# Patient Record
Sex: FEMALE | Race: WHITE | NOT HISPANIC OR LATINO | Employment: UNEMPLOYED | URBAN - METROPOLITAN AREA
[De-identification: names, ages, dates, MRNs, and addresses within clinical notes are randomized per-mention and may not be internally consistent; named-entity substitution may affect disease eponyms.]

---

## 2025-03-19 ENCOUNTER — APPOINTMENT (OUTPATIENT)
Dept: CARDIOLOGY | Facility: HOSPITAL | Age: 60
End: 2025-03-19
Payer: COMMERCIAL

## 2025-03-19 ENCOUNTER — HOSPITAL ENCOUNTER (OUTPATIENT)
Facility: HOSPITAL | Age: 60
Setting detail: OBSERVATION
Discharge: HOME | End: 2025-03-22
Attending: EMERGENCY MEDICINE | Admitting: STUDENT IN AN ORGANIZED HEALTH CARE EDUCATION/TRAINING PROGRAM
Payer: COMMERCIAL

## 2025-03-19 ENCOUNTER — APPOINTMENT (OUTPATIENT)
Dept: RADIOLOGY | Facility: HOSPITAL | Age: 60
End: 2025-03-19
Payer: COMMERCIAL

## 2025-03-19 DIAGNOSIS — R07.9 CHEST PAIN, UNSPECIFIED TYPE: ICD-10-CM

## 2025-03-19 DIAGNOSIS — I10 ESSENTIAL (PRIMARY) HYPERTENSION: ICD-10-CM

## 2025-03-19 DIAGNOSIS — I16.1 HYPERTENSIVE EMERGENCY: Primary | ICD-10-CM

## 2025-03-19 LAB
ALBUMIN SERPL BCP-MCNC: 4.3 G/DL (ref 3.4–5)
ALP SERPL-CCNC: 73 U/L (ref 33–110)
ALT SERPL W P-5'-P-CCNC: 42 U/L (ref 7–45)
ANION GAP SERPL CALC-SCNC: 16 MMOL/L (ref 10–20)
AST SERPL W P-5'-P-CCNC: 39 U/L (ref 9–39)
BASOPHILS # BLD AUTO: 0.04 X10*3/UL (ref 0–0.1)
BASOPHILS NFR BLD AUTO: 0.4 %
BILIRUB SERPL-MCNC: 0.4 MG/DL (ref 0–1.2)
BUN SERPL-MCNC: 17 MG/DL (ref 6–23)
CALCIUM SERPL-MCNC: 9.5 MG/DL (ref 8.6–10.3)
CARDIAC TROPONIN I PNL SERPL HS: 21 NG/L (ref 0–13)
CHLORIDE SERPL-SCNC: 103 MMOL/L (ref 98–107)
CO2 SERPL-SCNC: 26 MMOL/L (ref 21–32)
CREAT SERPL-MCNC: 0.64 MG/DL (ref 0.5–1.05)
EGFRCR SERPLBLD CKD-EPI 2021: >90 ML/MIN/1.73M*2
EOSINOPHIL # BLD AUTO: 0.36 X10*3/UL (ref 0–0.7)
EOSINOPHIL NFR BLD AUTO: 3.3 %
ERYTHROCYTE [DISTWIDTH] IN BLOOD BY AUTOMATED COUNT: 13 % (ref 11.5–14.5)
GLUCOSE SERPL-MCNC: 153 MG/DL (ref 74–99)
HCT VFR BLD AUTO: 37.6 % (ref 36–46)
HGB BLD-MCNC: 12.9 G/DL (ref 12–16)
IMM GRANULOCYTES # BLD AUTO: 0.03 X10*3/UL (ref 0–0.7)
IMM GRANULOCYTES NFR BLD AUTO: 0.3 % (ref 0–0.9)
LYMPHOCYTES # BLD AUTO: 3.65 X10*3/UL (ref 1.2–4.8)
LYMPHOCYTES NFR BLD AUTO: 33.2 %
MCH RBC QN AUTO: 29.7 PG (ref 26–34)
MCHC RBC AUTO-ENTMCNC: 34.3 G/DL (ref 32–36)
MCV RBC AUTO: 87 FL (ref 80–100)
MONOCYTES # BLD AUTO: 0.94 X10*3/UL (ref 0.1–1)
MONOCYTES NFR BLD AUTO: 8.6 %
NEUTROPHILS # BLD AUTO: 5.97 X10*3/UL (ref 1.2–7.7)
NEUTROPHILS NFR BLD AUTO: 54.2 %
NRBC BLD-RTO: 0 /100 WBCS (ref 0–0)
PLATELET # BLD AUTO: 204 X10*3/UL (ref 150–450)
POTASSIUM SERPL-SCNC: 3.6 MMOL/L (ref 3.5–5.3)
PROT SERPL-MCNC: 7.6 G/DL (ref 6.4–8.2)
RBC # BLD AUTO: 4.34 X10*6/UL (ref 4–5.2)
SODIUM SERPL-SCNC: 141 MMOL/L (ref 136–145)
WBC # BLD AUTO: 11 X10*3/UL (ref 4.4–11.3)

## 2025-03-19 PROCEDURE — 80053 COMPREHEN METABOLIC PANEL: CPT | Performed by: EMERGENCY MEDICINE

## 2025-03-19 PROCEDURE — 93005 ELECTROCARDIOGRAM TRACING: CPT

## 2025-03-19 PROCEDURE — 71045 X-RAY EXAM CHEST 1 VIEW: CPT

## 2025-03-19 PROCEDURE — 2500000004 HC RX 250 GENERAL PHARMACY W/ HCPCS (ALT 636 FOR OP/ED): Performed by: EMERGENCY MEDICINE

## 2025-03-19 PROCEDURE — 84484 ASSAY OF TROPONIN QUANT: CPT | Performed by: EMERGENCY MEDICINE

## 2025-03-19 PROCEDURE — 85025 COMPLETE CBC W/AUTO DIFF WBC: CPT | Performed by: EMERGENCY MEDICINE

## 2025-03-19 PROCEDURE — 96374 THER/PROPH/DIAG INJ IV PUSH: CPT

## 2025-03-19 PROCEDURE — 70450 CT HEAD/BRAIN W/O DYE: CPT | Performed by: STUDENT IN AN ORGANIZED HEALTH CARE EDUCATION/TRAINING PROGRAM

## 2025-03-19 PROCEDURE — 99285 EMERGENCY DEPT VISIT HI MDM: CPT | Mod: 25 | Performed by: EMERGENCY MEDICINE

## 2025-03-19 PROCEDURE — 36415 COLL VENOUS BLD VENIPUNCTURE: CPT | Performed by: EMERGENCY MEDICINE

## 2025-03-19 PROCEDURE — 93010 ELECTROCARDIOGRAM REPORT: CPT | Performed by: INTERNAL MEDICINE

## 2025-03-19 PROCEDURE — 70450 CT HEAD/BRAIN W/O DYE: CPT

## 2025-03-19 PROCEDURE — 99285 EMERGENCY DEPT VISIT HI MDM: CPT | Performed by: EMERGENCY MEDICINE

## 2025-03-19 PROCEDURE — 71045 X-RAY EXAM CHEST 1 VIEW: CPT | Performed by: RADIOLOGY

## 2025-03-19 RX ORDER — LABETALOL HYDROCHLORIDE 5 MG/ML
10 INJECTION, SOLUTION INTRAVENOUS ONCE
Status: COMPLETED | OUTPATIENT
Start: 2025-03-19 | End: 2025-03-19

## 2025-03-19 RX ADMIN — LABETALOL HYDROCHLORIDE 10 MG: 5 INJECTION, SOLUTION INTRAVENOUS at 23:54

## 2025-03-19 ASSESSMENT — PAIN - FUNCTIONAL ASSESSMENT: PAIN_FUNCTIONAL_ASSESSMENT: 0-10

## 2025-03-19 ASSESSMENT — COLUMBIA-SUICIDE SEVERITY RATING SCALE - C-SSRS
2. HAVE YOU ACTUALLY HAD ANY THOUGHTS OF KILLING YOURSELF?: NO
1. IN THE PAST MONTH, HAVE YOU WISHED YOU WERE DEAD OR WISHED YOU COULD GO TO SLEEP AND NOT WAKE UP?: NO
6. HAVE YOU EVER DONE ANYTHING, STARTED TO DO ANYTHING, OR PREPARED TO DO ANYTHING TO END YOUR LIFE?: NO

## 2025-03-19 ASSESSMENT — HEART SCORE
HEART SCORE: 4
ECG: NORMAL
AGE: 45-64
HISTORY: MODERATELY SUSPICIOUS
TROPONIN: LESS THAN OR EQUAL TO NORMAL LIMIT
RISK FACTORS: >2 RISK FACTORS OR HX OF ATHEROSCLEROTIC DISEASE

## 2025-03-19 ASSESSMENT — PAIN SCALES - GENERAL
PAINLEVEL_OUTOF10: 3
PAINLEVEL_OUTOF10: 0 - NO PAIN

## 2025-03-19 ASSESSMENT — PAIN DESCRIPTION - ORIENTATION: ORIENTATION: LEFT;UPPER

## 2025-03-19 ASSESSMENT — PAIN DESCRIPTION - LOCATION: LOCATION: CHEST

## 2025-03-20 ENCOUNTER — APPOINTMENT (OUTPATIENT)
Dept: CARDIOLOGY | Facility: HOSPITAL | Age: 60
End: 2025-03-20
Payer: COMMERCIAL

## 2025-03-20 PROBLEM — R07.9 CHEST PAIN: Status: ACTIVE | Noted: 2025-03-20

## 2025-03-20 LAB
ALBUMIN SERPL BCP-MCNC: 3.8 G/DL (ref 3.4–5)
ANION GAP SERPL CALC-SCNC: 13 MMOL/L (ref 10–20)
AORTIC VALVE MEAN GRADIENT: 9 MMHG
AORTIC VALVE PEAK VELOCITY: 2.04 M/S
ATRIAL RATE: 70 BPM
ATRIAL RATE: 73 BPM
ATRIAL RATE: 75 BPM
ATRIAL RATE: 76 BPM
AV PEAK GRADIENT: 17 MMHG
AVA (PEAK VEL): 2.04 CM2
AVA (VTI): 2.1 CM2
BASOPHILS # BLD AUTO: 0.03 X10*3/UL (ref 0–0.1)
BASOPHILS NFR BLD AUTO: 0.3 %
BUN SERPL-MCNC: 18 MG/DL (ref 6–23)
CALCIUM SERPL-MCNC: 9.1 MG/DL (ref 8.6–10.3)
CARDIAC TROPONIN I PNL SERPL HS: 17 NG/L (ref 0–13)
CARDIAC TROPONIN I PNL SERPL HS: 22 NG/L (ref 0–13)
CHLORIDE SERPL-SCNC: 102 MMOL/L (ref 98–107)
CO2 SERPL-SCNC: 25 MMOL/L (ref 21–32)
CREAT SERPL-MCNC: 0.59 MG/DL (ref 0.5–1.05)
EGFRCR SERPLBLD CKD-EPI 2021: >90 ML/MIN/1.73M*2
EJECTION FRACTION APICAL 4 CHAMBER: 67.2
EJECTION FRACTION: 60 %
EOSINOPHIL # BLD AUTO: 0.23 X10*3/UL (ref 0–0.7)
EOSINOPHIL NFR BLD AUTO: 2.6 %
ERYTHROCYTE [DISTWIDTH] IN BLOOD BY AUTOMATED COUNT: 12.9 % (ref 11.5–14.5)
GLUCOSE BLD MANUAL STRIP-MCNC: 241 MG/DL (ref 74–99)
GLUCOSE BLD MANUAL STRIP-MCNC: 282 MG/DL (ref 74–99)
GLUCOSE BLD MANUAL STRIP-MCNC: 305 MG/DL (ref 74–99)
GLUCOSE SERPL-MCNC: 291 MG/DL (ref 74–99)
HCT VFR BLD AUTO: 35.4 % (ref 36–46)
HGB BLD-MCNC: 11.8 G/DL (ref 12–16)
IMM GRANULOCYTES # BLD AUTO: 0.04 X10*3/UL (ref 0–0.7)
IMM GRANULOCYTES NFR BLD AUTO: 0.5 % (ref 0–0.9)
LEFT ATRIUM VOLUME AREA LENGTH INDEX BSA: 35.3 ML/M2
LEFT VENTRICLE INTERNAL DIMENSION DIASTOLE: 5.6 CM (ref 3.5–6)
LEFT VENTRICULAR OUTFLOW TRACT DIAMETER: 1.9 CM
LV EJECTION FRACTION BIPLANE: 63 %
LYMPHOCYTES # BLD AUTO: 2.57 X10*3/UL (ref 1.2–4.8)
LYMPHOCYTES NFR BLD AUTO: 29.3 %
MAGNESIUM SERPL-MCNC: 1.3 MG/DL (ref 1.6–2.4)
MAGNESIUM SERPL-MCNC: 1.37 MG/DL (ref 1.6–2.4)
MCH RBC QN AUTO: 29.6 PG (ref 26–34)
MCHC RBC AUTO-ENTMCNC: 33.3 G/DL (ref 32–36)
MCV RBC AUTO: 89 FL (ref 80–100)
MITRAL VALVE E/A RATIO: 1.74
MONOCYTES # BLD AUTO: 0.59 X10*3/UL (ref 0.1–1)
MONOCYTES NFR BLD AUTO: 6.7 %
NEUTROPHILS # BLD AUTO: 5.32 X10*3/UL (ref 1.2–7.7)
NEUTROPHILS NFR BLD AUTO: 60.6 %
NRBC BLD-RTO: 0 /100 WBCS (ref 0–0)
P AXIS: 59 DEGREES
P AXIS: 96 DEGREES
P OFFSET: 162 MS
P OFFSET: 177 MS
P ONSET: 115 MS
P ONSET: 119 MS
PHOSPHATE SERPL-MCNC: 3.5 MG/DL (ref 2.5–4.9)
PLATELET # BLD AUTO: 178 X10*3/UL (ref 150–450)
POTASSIUM SERPL-SCNC: 3.6 MMOL/L (ref 3.5–5.3)
PR INTERVAL: 188 MS
PR INTERVAL: 192 MS
PR INTERVAL: 192 MS
PR INTERVAL: 200 MS
Q ONSET: 213 MS
Q ONSET: 215 MS
Q ONSET: 215 MS
Q ONSET: 216 MS
QRS COUNT: 11 BEATS
QRS COUNT: 13 BEATS
QRS DURATION: 102 MS
QRS DURATION: 102 MS
QRS DURATION: 106 MS
QRS DURATION: 108 MS
QT INTERVAL: 396 MS
QT INTERVAL: 402 MS
QT INTERVAL: 408 MS
QT INTERVAL: 420 MS
QTC CALCULATION(BAZETT): 440 MS
QTC CALCULATION(BAZETT): 442 MS
QTC CALCULATION(BAZETT): 445 MS
QTC CALCULATION(BAZETT): 469 MS
QTC FREDERICIA: 428 MS
QTC FREDERICIA: 429 MS
QTC FREDERICIA: 429 MS
QTC FREDERICIA: 452 MS
R AXIS: 11 DEGREES
R AXIS: 20 DEGREES
R AXIS: 23 DEGREES
R AXIS: 24 DEGREES
RBC # BLD AUTO: 3.99 X10*6/UL (ref 4–5.2)
RIGHT VENTRICLE FREE WALL PEAK S': 12.3 CM/S
RIGHT VENTRICLE PEAK SYSTOLIC PRESSURE: 33.5 MMHG
SODIUM SERPL-SCNC: 136 MMOL/L (ref 136–145)
T AXIS: 22 DEGREES
T AXIS: 24 DEGREES
T AXIS: 25 DEGREES
T AXIS: 29 DEGREES
T OFFSET: 413 MS
T OFFSET: 417 MS
T OFFSET: 419 MS
T OFFSET: 423 MS
TRICUSPID ANNULAR PLANE SYSTOLIC EXCURSION: 2.1 CM
VENTRICULAR RATE: 70 BPM
VENTRICULAR RATE: 73 BPM
VENTRICULAR RATE: 75 BPM
VENTRICULAR RATE: 76 BPM
WBC # BLD AUTO: 8.8 X10*3/UL (ref 4.4–11.3)

## 2025-03-20 PROCEDURE — 84484 ASSAY OF TROPONIN QUANT: CPT | Performed by: EMERGENCY MEDICINE

## 2025-03-20 PROCEDURE — 36415 COLL VENOUS BLD VENIPUNCTURE: CPT | Performed by: EMERGENCY MEDICINE

## 2025-03-20 PROCEDURE — 82947 ASSAY GLUCOSE BLOOD QUANT: CPT | Mod: 59

## 2025-03-20 PROCEDURE — 80069 RENAL FUNCTION PANEL: CPT

## 2025-03-20 PROCEDURE — 83735 ASSAY OF MAGNESIUM: CPT

## 2025-03-20 PROCEDURE — 2500000004 HC RX 250 GENERAL PHARMACY W/ HCPCS (ALT 636 FOR OP/ED): Performed by: STUDENT IN AN ORGANIZED HEALTH CARE EDUCATION/TRAINING PROGRAM

## 2025-03-20 PROCEDURE — 2500000004 HC RX 250 GENERAL PHARMACY W/ HCPCS (ALT 636 FOR OP/ED)

## 2025-03-20 PROCEDURE — 93010 ELECTROCARDIOGRAM REPORT: CPT | Performed by: INTERNAL MEDICINE

## 2025-03-20 PROCEDURE — 2500000002 HC RX 250 W HCPCS SELF ADMINISTERED DRUGS (ALT 637 FOR MEDICARE OP, ALT 636 FOR OP/ED)

## 2025-03-20 PROCEDURE — 93005 ELECTROCARDIOGRAM TRACING: CPT

## 2025-03-20 PROCEDURE — G0378 HOSPITAL OBSERVATION PER HR: HCPCS

## 2025-03-20 PROCEDURE — 99255 IP/OBS CONSLTJ NEW/EST HI 80: CPT | Performed by: INTERNAL MEDICINE

## 2025-03-20 PROCEDURE — 96372 THER/PROPH/DIAG INJ SC/IM: CPT

## 2025-03-20 PROCEDURE — 83036 HEMOGLOBIN GLYCOSYLATED A1C: CPT | Mod: STJLAB

## 2025-03-20 PROCEDURE — 2500000001 HC RX 250 WO HCPCS SELF ADMINISTERED DRUGS (ALT 637 FOR MEDICARE OP)

## 2025-03-20 PROCEDURE — 93306 TTE W/DOPPLER COMPLETE: CPT | Performed by: INTERNAL MEDICINE

## 2025-03-20 PROCEDURE — 93306 TTE W/DOPPLER COMPLETE: CPT

## 2025-03-20 PROCEDURE — 85025 COMPLETE CBC W/AUTO DIFF WBC: CPT

## 2025-03-20 PROCEDURE — 2500000004 HC RX 250 GENERAL PHARMACY W/ HCPCS (ALT 636 FOR OP/ED): Performed by: EMERGENCY MEDICINE

## 2025-03-20 PROCEDURE — 36415 COLL VENOUS BLD VENIPUNCTURE: CPT

## 2025-03-20 PROCEDURE — 84484 ASSAY OF TROPONIN QUANT: CPT

## 2025-03-20 PROCEDURE — 99223 1ST HOSP IP/OBS HIGH 75: CPT

## 2025-03-20 PROCEDURE — 2500000001 HC RX 250 WO HCPCS SELF ADMINISTERED DRUGS (ALT 637 FOR MEDICARE OP): Performed by: EMERGENCY MEDICINE

## 2025-03-20 PROCEDURE — 2500000001 HC RX 250 WO HCPCS SELF ADMINISTERED DRUGS (ALT 637 FOR MEDICARE OP): Performed by: INTERNAL MEDICINE

## 2025-03-20 PROCEDURE — 96376 TX/PRO/DX INJ SAME DRUG ADON: CPT | Mod: 59

## 2025-03-20 RX ORDER — LABETALOL HYDROCHLORIDE 5 MG/ML
10 INJECTION, SOLUTION INTRAVENOUS ONCE
Status: COMPLETED | OUTPATIENT
Start: 2025-03-20 | End: 2025-03-20

## 2025-03-20 RX ORDER — VALSARTAN 80 MG/1
80 TABLET ORAL 2 TIMES DAILY
Status: COMPLETED | OUTPATIENT
Start: 2025-03-20 | End: 2025-03-21

## 2025-03-20 RX ORDER — CETIRIZINE HYDROCHLORIDE 10 MG/1
10 TABLET ORAL DAILY PRN
Status: DISCONTINUED | OUTPATIENT
Start: 2025-03-20 | End: 2025-03-22 | Stop reason: HOSPADM

## 2025-03-20 RX ORDER — CHLORTHALIDONE 25 MG/1
25 TABLET ORAL DAILY
Status: DISCONTINUED | OUTPATIENT
Start: 2025-03-20 | End: 2025-03-22 | Stop reason: HOSPADM

## 2025-03-20 RX ORDER — LISINOPRIL 20 MG/1
20 TABLET ORAL DAILY
Status: DISCONTINUED | OUTPATIENT
Start: 2025-03-20 | End: 2025-03-20

## 2025-03-20 RX ORDER — DEXTROSE 50 % IN WATER (D50W) INTRAVENOUS SYRINGE
12.5
Status: DISCONTINUED | OUTPATIENT
Start: 2025-03-20 | End: 2025-03-21 | Stop reason: SDUPTHER

## 2025-03-20 RX ORDER — ONDANSETRON HYDROCHLORIDE 2 MG/ML
4 INJECTION, SOLUTION INTRAVENOUS EVERY 6 HOURS PRN
Status: DISCONTINUED | OUTPATIENT
Start: 2025-03-20 | End: 2025-03-22 | Stop reason: HOSPADM

## 2025-03-20 RX ORDER — POLYETHYLENE GLYCOL 3350 17 G/17G
17 POWDER, FOR SOLUTION ORAL DAILY
Status: DISCONTINUED | OUTPATIENT
Start: 2025-03-20 | End: 2025-03-22 | Stop reason: HOSPADM

## 2025-03-20 RX ORDER — METOPROLOL SUCCINATE 25 MG/1
25 TABLET, EXTENDED RELEASE ORAL ONCE
Status: COMPLETED | OUTPATIENT
Start: 2025-03-20 | End: 2025-03-20

## 2025-03-20 RX ORDER — DIPHENHYDRAMINE HCL 25 MG
25 TABLET ORAL ONCE
Status: COMPLETED | OUTPATIENT
Start: 2025-03-20 | End: 2025-03-20

## 2025-03-20 RX ORDER — PANTOPRAZOLE SODIUM 40 MG/1
40 TABLET, DELAYED RELEASE ORAL
Status: DISCONTINUED | OUTPATIENT
Start: 2025-03-21 | End: 2025-03-22 | Stop reason: HOSPADM

## 2025-03-20 RX ORDER — MULTIVIT-MIN/IRON FUM/FOLIC AC 7.5 MG-4
1 TABLET ORAL DAILY
COMMUNITY

## 2025-03-20 RX ORDER — ATORVASTATIN CALCIUM 40 MG/1
40 TABLET, FILM COATED ORAL NIGHTLY
Status: DISCONTINUED | OUTPATIENT
Start: 2025-03-20 | End: 2025-03-22 | Stop reason: HOSPADM

## 2025-03-20 RX ORDER — CETIRIZINE HYDROCHLORIDE 10 MG/1
10 TABLET ORAL DAILY PRN
COMMUNITY

## 2025-03-20 RX ORDER — INSULIN GLARGINE 100 [IU]/ML
65 INJECTION, SOLUTION SUBCUTANEOUS NIGHTLY
COMMUNITY
End: 2025-03-22 | Stop reason: HOSPADM

## 2025-03-20 RX ORDER — CHOLECALCIFEROL (VITAMIN D3) 50 MCG
50 TABLET ORAL DAILY
COMMUNITY

## 2025-03-20 RX ORDER — CYCLOBENZAPRINE HCL 10 MG
10 TABLET ORAL 2 TIMES DAILY PRN
COMMUNITY

## 2025-03-20 RX ORDER — METFORMIN HYDROCHLORIDE 1000 MG/1
1000 TABLET ORAL
COMMUNITY

## 2025-03-20 RX ORDER — DEXTROSE 50 % IN WATER (D50W) INTRAVENOUS SYRINGE
25
Status: DISCONTINUED | OUTPATIENT
Start: 2025-03-20 | End: 2025-03-22 | Stop reason: HOSPADM

## 2025-03-20 RX ORDER — NAPROXEN SODIUM 220 MG/1
324 TABLET, FILM COATED ORAL ONCE
Status: COMPLETED | OUTPATIENT
Start: 2025-03-20 | End: 2025-03-20

## 2025-03-20 RX ORDER — ESTRADIOL 1 MG/1
1 TABLET ORAL DAILY
COMMUNITY

## 2025-03-20 RX ORDER — CYCLOBENZAPRINE HCL 10 MG
10 TABLET ORAL 2 TIMES DAILY PRN
Status: DISCONTINUED | OUTPATIENT
Start: 2025-03-20 | End: 2025-03-22 | Stop reason: HOSPADM

## 2025-03-20 RX ORDER — ATORVASTATIN CALCIUM 40 MG/1
40 TABLET, FILM COATED ORAL NIGHTLY
COMMUNITY

## 2025-03-20 RX ORDER — DEXTROSE 50 % IN WATER (D50W) INTRAVENOUS SYRINGE
25
Status: DISCONTINUED | OUTPATIENT
Start: 2025-03-20 | End: 2025-03-21 | Stop reason: SDUPTHER

## 2025-03-20 RX ORDER — ENOXAPARIN SODIUM 100 MG/ML
40 INJECTION SUBCUTANEOUS
Status: DISCONTINUED | OUTPATIENT
Start: 2025-03-20 | End: 2025-03-22 | Stop reason: HOSPADM

## 2025-03-20 RX ORDER — ESTRADIOL 1 MG/1
1 TABLET ORAL DAILY
Status: DISCONTINUED | OUTPATIENT
Start: 2025-03-20 | End: 2025-03-22 | Stop reason: HOSPADM

## 2025-03-20 RX ORDER — INSULIN LISPRO 100 [IU]/ML
5 INJECTION, SOLUTION INTRAVENOUS; SUBCUTANEOUS
Status: DISCONTINUED | OUTPATIENT
Start: 2025-03-20 | End: 2025-03-22 | Stop reason: HOSPADM

## 2025-03-20 RX ORDER — INSULIN GLARGINE 100 [IU]/ML
20 INJECTION, SOLUTION SUBCUTANEOUS NIGHTLY
Status: DISCONTINUED | OUTPATIENT
Start: 2025-03-20 | End: 2025-03-21

## 2025-03-20 RX ORDER — ASPIRIN 81 MG/1
81 TABLET ORAL DAILY
COMMUNITY

## 2025-03-20 RX ORDER — GLIPIZIDE 10 MG/1
10 TABLET ORAL
COMMUNITY

## 2025-03-20 RX ORDER — INSULIN LISPRO 100 [IU]/ML
0-5 INJECTION, SOLUTION INTRAVENOUS; SUBCUTANEOUS
Status: DISCONTINUED | OUTPATIENT
Start: 2025-03-20 | End: 2025-03-22 | Stop reason: HOSPADM

## 2025-03-20 RX ORDER — CLONIDINE HYDROCHLORIDE 0.1 MG/1
0.1 TABLET ORAL EVERY 6 HOURS PRN
Status: DISCONTINUED | OUTPATIENT
Start: 2025-03-20 | End: 2025-03-22 | Stop reason: HOSPADM

## 2025-03-20 RX ORDER — LISINOPRIL 40 MG/1
40 TABLET ORAL DAILY
COMMUNITY
End: 2025-03-22 | Stop reason: HOSPADM

## 2025-03-20 RX ORDER — PANTOPRAZOLE SODIUM 40 MG/1
40 TABLET, DELAYED RELEASE ORAL
COMMUNITY

## 2025-03-20 RX ORDER — DEXTROSE 50 % IN WATER (D50W) INTRAVENOUS SYRINGE
12.5
Status: DISCONTINUED | OUTPATIENT
Start: 2025-03-20 | End: 2025-03-22 | Stop reason: HOSPADM

## 2025-03-20 RX ADMIN — CHLORTHALIDONE 25 MG: 25 TABLET ORAL at 11:29

## 2025-03-20 RX ADMIN — LABETALOL HYDROCHLORIDE 10 MG: 5 INJECTION, SOLUTION INTRAVENOUS at 02:12

## 2025-03-20 RX ADMIN — ENOXAPARIN SODIUM 40 MG: 40 INJECTION SUBCUTANEOUS at 11:42

## 2025-03-20 RX ADMIN — DIPHENHYDRAMINE HYDROCHLORIDE 25 MG: 25 TABLET ORAL at 13:08

## 2025-03-20 RX ADMIN — LABETALOL HYDROCHLORIDE 10 MG: 5 INJECTION, SOLUTION INTRAVENOUS at 11:32

## 2025-03-20 RX ADMIN — VALSARTAN 80 MG: 80 TABLET, FILM COATED ORAL at 20:32

## 2025-03-20 RX ADMIN — INSULIN GLARGINE 20 UNITS: 100 INJECTION, SOLUTION SUBCUTANEOUS at 21:35

## 2025-03-20 RX ADMIN — LABETALOL HYDROCHLORIDE 10 MG: 5 INJECTION, SOLUTION INTRAVENOUS at 07:19

## 2025-03-20 RX ADMIN — METOPROLOL SUCCINATE 25 MG: 25 TABLET, EXTENDED RELEASE ORAL at 03:05

## 2025-03-20 RX ADMIN — LISINOPRIL 20 MG: 10 TABLET ORAL at 11:41

## 2025-03-20 RX ADMIN — INSULIN LISPRO 3 UNITS: 100 INJECTION, SOLUTION INTRAVENOUS; SUBCUTANEOUS at 11:48

## 2025-03-20 RX ADMIN — ATORVASTATIN CALCIUM 40 MG: 40 TABLET, FILM COATED ORAL at 20:32

## 2025-03-20 RX ADMIN — INSULIN LISPRO 2 UNITS: 100 INJECTION, SOLUTION INTRAVENOUS; SUBCUTANEOUS at 16:55

## 2025-03-20 RX ADMIN — INSULIN LISPRO 5 UNITS: 100 INJECTION, SOLUTION INTRAVENOUS; SUBCUTANEOUS at 16:57

## 2025-03-20 RX ADMIN — ASPIRIN 324 MG: 81 TABLET, CHEWABLE ORAL at 01:14

## 2025-03-20 RX ADMIN — SITAGLIPTIN 100 MG: 100 TABLET, FILM COATED ORAL at 16:55

## 2025-03-20 RX ADMIN — CLONIDINE HYDROCHLORIDE 0.1 MG: 0.1 TABLET ORAL at 11:31

## 2025-03-20 SDOH — ECONOMIC STABILITY: INCOME INSECURITY
IN THE PAST 12 MONTHS HAS THE ELECTRIC, GAS, OIL, OR WATER COMPANY THREATENED TO SHUT OFF SERVICES IN YOUR HOME?: PATIENT DECLINED

## 2025-03-20 SDOH — ECONOMIC STABILITY: TRANSPORTATION INSECURITY
IN THE PAST 12 MONTHS, HAS LACK OF TRANSPORTATION KEPT YOU FROM MEDICAL APPOINTMENTS OR FROM GETTING MEDICATIONS?: PATIENT DECLINED

## 2025-03-20 SDOH — ECONOMIC STABILITY: HOUSING INSECURITY: IN THE LAST 12 MONTHS, WAS THERE A TIME WHEN YOU WERE NOT ABLE TO PAY THE MORTGAGE OR RENT ON TIME?: PATIENT DECLINED

## 2025-03-20 SDOH — SOCIAL STABILITY: SOCIAL INSECURITY
WITHIN THE LAST YEAR, HAVE YOU BEEN RAPED OR FORCED TO HAVE ANY KIND OF SEXUAL ACTIVITY BY YOUR PARTNER OR EX-PARTNER?: PATIENT DECLINED

## 2025-03-20 SDOH — ECONOMIC STABILITY: FOOD INSECURITY
WITHIN THE PAST 12 MONTHS, YOU WORRIED THAT YOUR FOOD WOULD RUN OUT BEFORE YOU GOT THE MONEY TO BUY MORE.: PATIENT DECLINED

## 2025-03-20 SDOH — SOCIAL STABILITY: SOCIAL INSECURITY: DOES ANYONE TRY TO KEEP YOU FROM HAVING/CONTACTING OTHER FRIENDS OR DOING THINGS OUTSIDE YOUR HOME?: NO

## 2025-03-20 SDOH — SOCIAL STABILITY: SOCIAL INSECURITY: DO YOU FEEL UNSAFE GOING BACK TO THE PLACE WHERE YOU ARE LIVING?: NO

## 2025-03-20 SDOH — SOCIAL STABILITY: SOCIAL INSECURITY
WITHIN THE LAST YEAR, HAVE YOU BEEN HUMILIATED OR EMOTIONALLY ABUSED IN OTHER WAYS BY YOUR PARTNER OR EX-PARTNER?: PATIENT DECLINED

## 2025-03-20 SDOH — SOCIAL STABILITY: SOCIAL INSECURITY: WERE YOU ABLE TO COMPLETE ALL THE BEHAVIORAL HEALTH SCREENINGS?: YES

## 2025-03-20 SDOH — SOCIAL STABILITY: SOCIAL INSECURITY: WITHIN THE LAST YEAR, HAVE YOU BEEN AFRAID OF YOUR PARTNER OR EX-PARTNER?: PATIENT DECLINED

## 2025-03-20 SDOH — SOCIAL STABILITY: SOCIAL INSECURITY: DO YOU FEEL ANYONE HAS EXPLOITED OR TAKEN ADVANTAGE OF YOU FINANCIALLY OR OF YOUR PERSONAL PROPERTY?: NO

## 2025-03-20 SDOH — ECONOMIC STABILITY: HOUSING INSECURITY: IN THE PAST 12 MONTHS, HOW MANY TIMES HAVE YOU MOVED WHERE YOU WERE LIVING?: 0

## 2025-03-20 SDOH — ECONOMIC STABILITY: FOOD INSECURITY: HOW HARD IS IT FOR YOU TO PAY FOR THE VERY BASICS LIKE FOOD, HOUSING, MEDICAL CARE, AND HEATING?: PATIENT DECLINED

## 2025-03-20 SDOH — SOCIAL STABILITY: SOCIAL INSECURITY: HAS ANYONE EVER THREATENED TO HURT YOUR FAMILY OR YOUR PETS?: NO

## 2025-03-20 SDOH — SOCIAL STABILITY: SOCIAL INSECURITY
WITHIN THE LAST YEAR, HAVE YOU BEEN KICKED, HIT, SLAPPED, OR OTHERWISE PHYSICALLY HURT BY YOUR PARTNER OR EX-PARTNER?: PATIENT DECLINED

## 2025-03-20 SDOH — ECONOMIC STABILITY: FOOD INSECURITY: WITHIN THE PAST 12 MONTHS, THE FOOD YOU BOUGHT JUST DIDN'T LAST AND YOU DIDN'T HAVE MONEY TO GET MORE.: PATIENT DECLINED

## 2025-03-20 SDOH — ECONOMIC STABILITY: HOUSING INSECURITY: AT ANY TIME IN THE PAST 12 MONTHS, WERE YOU HOMELESS OR LIVING IN A SHELTER (INCLUDING NOW)?: PATIENT DECLINED

## 2025-03-20 SDOH — SOCIAL STABILITY: SOCIAL INSECURITY: ARE YOU OR HAVE YOU BEEN THREATENED OR ABUSED PHYSICALLY, EMOTIONALLY, OR SEXUALLY BY ANYONE?: NO

## 2025-03-20 SDOH — SOCIAL STABILITY: SOCIAL INSECURITY: ABUSE: ADULT

## 2025-03-20 SDOH — SOCIAL STABILITY: SOCIAL INSECURITY: HAVE YOU HAD THOUGHTS OF HARMING ANYONE ELSE?: NO

## 2025-03-20 SDOH — SOCIAL STABILITY: SOCIAL INSECURITY: ARE THERE ANY APPARENT SIGNS OF INJURIES/BEHAVIORS THAT COULD BE RELATED TO ABUSE/NEGLECT?: NO

## 2025-03-20 ASSESSMENT — ACTIVITIES OF DAILY LIVING (ADL)
HEARING - LEFT EAR: FUNCTIONAL
JUDGMENT_ADEQUATE_SAFELY_COMPLETE_DAILY_ACTIVITIES: YES
HEARING - RIGHT EAR: FUNCTIONAL
LACK_OF_TRANSPORTATION: PATIENT DECLINED
GROOMING: INDEPENDENT
FEEDING YOURSELF: INDEPENDENT
DRESSING YOURSELF: INDEPENDENT
WALKS IN HOME: INDEPENDENT
LACK_OF_TRANSPORTATION: PATIENT DECLINED
ADEQUATE_TO_COMPLETE_ADL: YES
TOILETING: INDEPENDENT
BATHING: INDEPENDENT
PATIENT'S MEMORY ADEQUATE TO SAFELY COMPLETE DAILY ACTIVITIES?: YES

## 2025-03-20 ASSESSMENT — COGNITIVE AND FUNCTIONAL STATUS - GENERAL
PATIENT BASELINE BEDBOUND: NO
MOBILITY SCORE: 24
MOBILITY SCORE: 24
PATIENT BASELINE BEDBOUND: NO
DAILY ACTIVITIY SCORE: 24
MOBILITY SCORE: 24

## 2025-03-20 ASSESSMENT — PAIN SCALES - GENERAL
PAINLEVEL_OUTOF10: 0 - NO PAIN
PAINLEVEL_OUTOF10: 0 - NO PAIN
PAINLEVEL_OUTOF10: 1
PAINLEVEL_OUTOF10: 5 - MODERATE PAIN
PAINLEVEL_OUTOF10: 0 - NO PAIN
PAINLEVEL_OUTOF10: 1
PAINLEVEL_OUTOF10: 0 - NO PAIN
PAINLEVEL_OUTOF10: 5 - MODERATE PAIN
PAINLEVEL_OUTOF10: 5 - MODERATE PAIN
PAINLEVEL_OUTOF10: 0 - NO PAIN

## 2025-03-20 ASSESSMENT — PAIN DESCRIPTION - LOCATION: LOCATION: CHEST

## 2025-03-20 ASSESSMENT — ENCOUNTER SYMPTOMS
CHEST TIGHTNESS: 1
NEUROLOGICAL NEGATIVE: 1
VOMITING: 0
MUSCULOSKELETAL NEGATIVE: 1
CONSTITUTIONAL NEGATIVE: 1
DIARRHEA: 0
ALLERGIC/IMMUNOLOGIC NEGATIVE: 1
NAUSEA: 1
PSYCHIATRIC NEGATIVE: 1
HEMATOLOGIC/LYMPHATIC NEGATIVE: 1
ENDOCRINE NEGATIVE: 1
EYES NEGATIVE: 1

## 2025-03-20 ASSESSMENT — LIFESTYLE VARIABLES
SKIP TO QUESTIONS 9-10: 1
HOW OFTEN DO YOU HAVE A DRINK CONTAINING ALCOHOL: MONTHLY OR LESS
AUDIT-C TOTAL SCORE: 1
HOW MANY STANDARD DRINKS CONTAINING ALCOHOL DO YOU HAVE ON A TYPICAL DAY: 1 OR 2
AUDIT-C TOTAL SCORE: 1
HOW OFTEN DO YOU HAVE 6 OR MORE DRINKS ON ONE OCCASION: NEVER

## 2025-03-20 ASSESSMENT — PAIN DESCRIPTION - DESCRIPTORS: DESCRIPTORS: ACHING;HEADACHE

## 2025-03-20 ASSESSMENT — PATIENT HEALTH QUESTIONNAIRE - PHQ9
2. FEELING DOWN, DEPRESSED OR HOPELESS: NOT AT ALL
1. LITTLE INTEREST OR PLEASURE IN DOING THINGS: NOT AT ALL
SUM OF ALL RESPONSES TO PHQ9 QUESTIONS 1 & 2: 0

## 2025-03-20 ASSESSMENT — PAIN DESCRIPTION - PAIN TYPE: TYPE: ACUTE PAIN

## 2025-03-20 ASSESSMENT — PAIN - FUNCTIONAL ASSESSMENT: PAIN_FUNCTIONAL_ASSESSMENT: 0-10

## 2025-03-20 NOTE — ED PROVIDER NOTES
HPI   Chief Complaint   Patient presents with    Hypertension       This is a 59-year-old female presents to the emergency room with hypertensive urgency.  She states earlier today she was having a headache, really bad, was making her feel nauseated, lightheaded, and just felt like a pounding in her head.  She then began and began having upper left-sided chest and armpit pain that felt like an ache.  She states the ache in the upper left chest happened for a couple of hours then dissipated.  There is no shortness of breath, no arm radiation, no back radiation, no radiation to the abdomen.  Denied any abdominal pain, or shortness of breath.  States the headache lingered as well sort of came and went today, noticing that it was worse with higher blood pressure.  At 1 point she was 233/110 at home.  She decided to call EMS and the blood pressure spiked again and she was feeling crummy right before she came in.  Now in the emergency room her blood pressure has improved to 210/85, and she feels better.  She states she saw her primary care doctor about 1 week ago, this is a new primary care doctor, and they noted her blood pressure to be elevated in the office 170s over 90s and they referred her to cardiology.  They did not start any new blood pressure medications.  She is already on lisinopril 40 mg daily and is maxed out on the lisinopril.  She states that she bought a blood pressure cuff this week because her doctor asked her to do so to take her blood pressures at home, and for the last 2 to 3 days her blood pressures have all been 190 or higher systolic and between 80 and 100 diastolic other than the really high reading this evening before she came into the emergency room.    Patient denied any numbness or tingling her arms or legs, no weakness in the arms or legs, no ataxia, again nausea without vomiting, and no abdominal pain.  Currently all symptoms have resolved.    Past medical history: Diabetes type II since 39  years of age, hypertension, hyperlipidemia.              Patient History   Past Medical History:   Diagnosis Date    Diabetes (Multi)     Gastro-esophageal reflux     Hyperlipidemia     Hypertension      History reviewed. No pertinent surgical history.  No family history on file.  Social History     Tobacco Use    Smoking status: Never    Smokeless tobacco: Never   Vaping Use    Vaping status: Never Used   Substance Use Topics    Alcohol use: Yes    Drug use: Never       Physical Exam   ED Triage Vitals [03/19/25 2121]   Temperature Heart Rate Respirations BP   35.7 °C (96.3 °F) 82 20 (!) 207/85      Pulse Ox Temp Source Heart Rate Source Patient Position   97 % Temporal -- Sitting      BP Location FiO2 (%)     Right arm --       Physical Exam  Constitutional:       Appearance: Normal appearance.   HENT:      Head: Normocephalic.   Eyes:      Extraocular Movements: Extraocular movements intact.      Conjunctiva/sclera: Conjunctivae normal.      Pupils: Pupils are equal, round, and reactive to light.   Cardiovascular:      Rate and Rhythm: Normal rate and regular rhythm.   Pulmonary:      Effort: Pulmonary effort is normal.      Breath sounds: Normal breath sounds.   Abdominal:      General: Abdomen is flat. Bowel sounds are normal.      Palpations: Abdomen is soft.   Musculoskeletal:         General: Normal range of motion.      Cervical back: Normal range of motion.   Skin:     General: Skin is warm.   Neurological:      General: No focal deficit present.      Mental Status: She is alert and oriented to person, place, and time.      Cranial Nerves: No cranial nerve deficit.      Comments: No pronator drift, no ataxia, good finger-nose, good heel-to-shin, strength 5 out of 5 upper and lower extremities.  No facial asymmetries, no stuttering speech, no aphasia or dysarthria.           ED Course & MDM   ED Course as of 03/20/25 0238   Wed Mar 19, 2025   2209 EKG reveals normal sinus rhythm at 75 bpm.  Note there is an  inverted T wave in lead III, otherwise no acute ST changes.  108 ms QRS, normal [place].  No comparison EKG in the system. [GR]   Thu Mar 20, 2025   0054 GLUCOSE(!): 153 [GR]   0059 POTASSIUM: 3.6 [GR]      ED Course User Index  [GR] Jose Davidson DO                 No data recorded     [place] Coma Scale Score: 15 (03/19/25 2136 : Emile Latif RN) HEART Score: 4 (03/19/25 2211 : Jose Davidson DO)                         Medical Decision Making  The patient's heart score is 4 given the symptoms, hypertension, and the symptoms she was having with the hypertension.  Symptoms all resolved completely with the labetalol.  Her blood pressure spiked again, to 230/100 that was checked manually as well as with the peripheral blood pressure cuff x 2, and again all blood pressures were above 238.  She was feeling the pounding again and some of the pressure in her chest therefore repeat dose of labetalol was provided, and the patient to be admitted to the hospital for further evaluation and management.  Diagnosis labile hypertension with symptoms and hypertensive urgency without endorgan damage.  The patient was being scheduled to see cardiology as an outpatient but has not followed up yet, therefore we believe a consultation with cardiology inpatient may be warranted given her heart her diabetes, hypertension, hyperlipidemia, chest pain and labile blood pressures.        Procedure  Procedures     Jose Davidson DO  03/20/25 0238

## 2025-03-20 NOTE — CARE PLAN
Problem: Pain - Adult  Goal: Verbalizes/displays adequate comfort level or baseline comfort level  Outcome: Progressing     Problem: Safety - Adult  Goal: Free from fall injury  Outcome: Progressing     Problem: Discharge Planning  Goal: Discharge to home or other facility with appropriate resources  Outcome: Progressing     Problem: Chronic Conditions and Co-morbidities  Goal: Patient's chronic conditions and co-morbidity symptoms are monitored and maintained or improved  Outcome: Progressing     Problem: Nutrition  Goal: Nutrient intake appropriate for maintaining nutritional needs  Outcome: Progressing     Problem: Skin  Goal: Participates in plan/prevention/treatment measures  Outcome: Progressing  Goal: Prevent/manage excess moisture  Outcome: Progressing  Goal: Prevent/minimize sheer/friction injuries  Outcome: Progressing  Goal: Promote/optimize nutrition  Outcome: Progressing  Goal: Promote skin healing  Outcome: Progressing     Problem: Fall/Injury  Goal: Not fall by end of shift  Outcome: Progressing  Goal: Be free from injury by end of the shift  Outcome: Progressing  Goal: Verbalize understanding of personal risk factors for fall in the hospital  Outcome: Progressing  Goal: Verbalize understanding of risk factor reduction measures to prevent injury from fall in the home  Outcome: Progressing  Goal: Use assistive devices by end of the shift  Outcome: Progressing  Goal: Pace activities to prevent fatigue by end of the shift  Outcome: Progressing     Problem: ACS/CP/NSTEMI/STEMI  Goal: Chest pain managed (free from pain or at acceptable level)  Outcome: Progressing  Goal: Lab values return to normal range  Outcome: Progressing  Goal: Promote self management  Outcome: Progressing  Goal: Serial ECG will return to baseline  Outcome: Progressing  Goal: Verbalize understanding of procedures/devices  Outcome: Progressing     Problem: Hypertensive Emergency/Crisis  Goal: Blood pressure gradually reduced to goal  range  Outcome: Progressing  Goal: Free from signs of organ damage  Outcome: Progressing  Goal: Lab values return to normal range  Outcome: Progressing  Goal: Promote self management  Outcome: Progressing        The clinical goals for the shift include sbp <180 t/o the shift    Over the shift, the patient did make progress toward the following goals.

## 2025-03-20 NOTE — ED TRIAGE NOTES
Pt states this afternoon at 1400 Pt states she started feeling bad. Pt took a nap from [id number]. Pt states she woke and still didn't feel good with symptoms getting worse. Initially Pt had nausea, headache with dizziness. Pt states she also had discomfort to left upper chest. Pt checked her BP at home with a reading of 233/110. Pt arrives to ED states symptoms have all resolved except for chest discomfort and hypertension.

## 2025-03-20 NOTE — PROGRESS NOTES
Emergency Medicine Transition of Care Note.    Patient was signed out to me by Dr. Swift.  Please see the previous ED provider note for all HPI, PE and MDM up to the time of signout at 7 AM. This is in addition to the primary record.    In brief, this patient is a 59-year-old female initially presenting to the ED for hypertension and left-sided chest pain. At the time of signout we were pending admission to medicine.     I spoke with the patient and her chest pain has improved but has not yet completely resolved.  She does have a positive troponin.  Her blood pressure is again elevated to 192 systolic.  She is getting labetalol.  I spoke with medicine and they accepted her as a telemetry observation admission after the private doctor reportedly refused.    ** Please excuse any errors in grammar or translation related to this dictation. Voice recognition software was utilized to prepare this document. **       Setphan Birch MD  [facility] Emergency  Medicine

## 2025-03-20 NOTE — H&P
Internal Medicine History & Physical     Patient Keila Roldan PCP No Assigned PCP Generic Provider, MD    MRN [id number]  Admission Date 3/19/2025      Chief Complaint/Reason for Admission:  Keila Roldan is a 59 y.o. female who presented 3/19/2025 with chest pain and elevated blood pressure.    Subjective    Subjective   HPI  Ms. Keila Roldan is a 59 y.o. female with chest pain and elevated blood pressure. Past medical history Type 2 DM, HTN, TIMBO, Osteoarthritis, GERD, recently diagnosed BPPV, HLD, Cholecystectomy, Hysterectomy.  Patient states she has been experiencing chest pain prior to presentation along with nausea.  Her primary care doctor was having her keep her blood pressure logs as she was elevated at her last appointment.  She reports having readings as high as 230 systolic.  She reports no changes in her medications.  She denies any shortness of breath or dyspnea on exertion.  At the time of examination her chest pain had resolved on its own.  She reports compliance taking her lisinopril at home.  She states little change from her normal diet but does admit to having more salty foods as of lately.  Denies sick contacts, vomiting, diarrhea, constipation and no urinary symptoms.  Reports no recent traumas. Denies smoking history, illicit drugs, alcohol use.    While in the emergency department she did develop a rash on both arms with pruritus.  It is unclear what the source was as she said it took her a while to notice.  Symptoms resolved with Benadryl.    ED Course  Vitals: Heart rate 82, RR 20, 97% on room air, initial blood pressure 207/85  Labs: Troponin 21->22  Micro: None  EKG: Sinus rhythm notable for couplets, rate 70, QTc 440; no signs of ischemia  Imaging: CT head negative for acute finding, chest x-ray no acute findings  Interventions: 3 doses of IV labetalol    Full Code    Review of Systems   Constitutional: Negative.    HENT: Negative.     Eyes: Negative.    Respiratory:  Positive for  "chest tightness.    Cardiovascular:  Positive for chest pain.   Gastrointestinal:  Positive for nausea. Negative for diarrhea and vomiting.   Endocrine: Negative.    Genitourinary: Negative.    Musculoskeletal: Negative.    Skin: Negative.    Allergic/Immunologic: Negative.    Neurological: Negative.    Hematological: Negative.    Psychiatric/Behavioral: Negative.           Objective   Vital Signs:  Visit Vitals  /75 (BP Location: Right arm, Patient Position: Lying)   Pulse 68   Temp 36.2 °C (97.2 °F) (Temporal)   Resp 18   Ht 1.753 m (5' 9\")   Wt 106 kg (234 lb 2.1 oz)   SpO2 95%   BMI 34.57 kg/m²   Smoking Status Never   BSA 2.27 m²        Physical Examination:  Physical Exam  Constitutional:       Appearance: She is normal weight.   Eyes:      Conjunctiva/sclera: Conjunctivae normal.   Cardiovascular:      Rate and Rhythm: Normal rate and regular rhythm.      Pulses: Normal pulses.      Heart sounds: Normal heart sounds.   Pulmonary:      Effort: Pulmonary effort is normal.      Breath sounds: Normal breath sounds.   Abdominal:      General: Abdomen is flat. Bowel sounds are normal.      Palpations: Abdomen is soft.   Musculoskeletal:      Right lower leg: No edema.   Skin:     General: Skin is warm and dry.   Neurological:      General: No focal deficit present.      Mental Status: She is alert. Mental status is at baseline.   Psychiatric:         Mood and Affect: Mood normal.         Thought Content: Thought content normal.          Laboratory Data:    Results from last 7 days   Lab Units 03/20/25  1315 03/19/25  2129   WBC AUTO x10*3/uL 8.8 11.0   RBC AUTO x10*6/uL 3.99* 4.34   HEMOGLOBIN g/dL 11.8* 12.9     Results from last 7 days   Lab Units 03/20/25  1315 03/19/25  2129   SODIUM mmol/L 136 141   POTASSIUM mmol/L 3.6 3.6   CHLORIDE mmol/L 102 103   CO2 mmol/L 25 26   BUN mg/dL 18 17   CREATININE mg/dL 0.59 0.64   CALCIUM mg/dL 9.1 9.5   PHOSPHORUS mg/dL 3.5  --    MAGNESIUM mg/dL 1.37*  --  "   BILIRUBIN TOTAL mg/dL  --  0.4   ALT U/L  --  42   AST U/L  --  39       Imaging:  Transthoracic Echo (TTE) Complete    Result Date: 3/20/2025            [facility] [address] [facility] [place] [id number]    Tel [phone] Fax [phone] TRANSTHORACIC ECHOCARDIOGRAM REPORT Patient Name:       THERESA OAKLEY        Reading Physician:    [id number] Julio Cummins MD Study Date:         3/20/2025            Ordering Provider:    [id number] SANCHEZ RAY MRN/PID:            [id number]             Fellow: Accession#:         [id number]         Nurse: Date of Birth/Age:  1965 / 59      Sonographer:          Marie hope Gender Assigned at  F                    Additional Staff: Birth: Height:             175.26 cm            Admit Date: Weight:             104.33 kg            Admission Status:     Inpatient -                                                                Routine BSA / BMI:          2.19 m2 / 33.97      Department Location:  [facility] Echo Lab                     kg/m2 Blood Pressure: 229 /78 mmHg Study Type:    TRANSTHORACIC ECHO (TTE) COMPLETE Diagnosis/ICD: Essential (primary) hypertension-I10; Chest pain,                unspecified-R07.9 Indication:    HTN, CP, Abnormal EKG CPT Codes:     Echo Complete w Full Doppler-[id number] Patient History: Pertinent History: Chest Pain and HTN. Study Detail: The following Echo studies were performed: 2D, M-Mode, Doppler and               color flow.  PHYSICIAN INTERPRETATION: Left Ventricle: Left ventricular ejection fraction is normal, by visual estimate at 60%. There is borderline left ventricular hypertrophy. There are no regional wall motion abnormalities. The left ventricular cavity size is normal. There is normal septal and mildly increased posterior left ventricular wall thickness. Spectral Doppler  shows a Grade II (pseudonormal pattern) of left ventricular diastolic filling with an elevated left atrial pressure. Left Atrium: The left atrial size is moderately dilated. Right Ventricle: The right ventricle is normal in size. There is normal right ventricular global systolic function. Right Atrium: The right atrial size is normal. Aortic Valve: The aortic valve is trileaflet. The aortic valve dimensionless index is 0.74. There is no evidence of aortic valve regurgitation. The peak instantaneous gradient of the aortic valve is 17 mmHg. The mean gradient of the aortic valve is 9 mmHg. Mitral Valve: The mitral valve is normal in structure. There is trace to mild mitral valve regurgitation. Tricuspid Valve: The tricuspid valve is structurally normal. There is trace tricuspid regurgitation. The Doppler estimated RVSP is within normal limits at 33.5 mmHg. Pulmonic Valve: The pulmonic valve is structurally normal. There is no indication of pulmonic valve regurgitation. Pericardium: No pericardial effusion noted. Aorta: The aortic root is normal. In comparison to the previous echocardiogram(s): There are no prior studies on this patient for comparison purposes.  CONCLUSIONS:  1. Left ventricular ejection fraction is normal, by visual estimate at 60%.  2. Spectral Doppler shows a Grade II (pseudonormal pattern) of left ventricular diastolic filling with an elevated left atrial pressure.  3. The left atrial size is moderately dilated.  4. Right ventricular systolic pressure is within normal limits. QUANTITATIVE DATA SUMMARY:  2D MEASUREMENTS:             Normal Ranges: LAs:             4.20 cm     (2.7-4.0cm) IVSd:            0.90 cm     (0.6-1.1cm) LVPWd:           1.20 cm     (0.6-1.1cm) LVIDd:           5.60 cm     (3.9-5.9cm) LVIDs:           3.50 cm LV Mass Index:   106.9 g/m2 LVEDV Index:     47.25 ml/m2 LV % FS          37.5 %  LEFT ATRIUM:                  Normal Ranges: LA Vol A4C:        73.7 ml    (22+/-6mL/m2)  LA Vol A2C:        76.7 ml LA Vol BP:         77.4 ml LA Vol Index A4C:  33.6ml/m2 LA Vol Index A2C:  35.0 ml/m2 LA Vol Index BP:   35.3 ml/m2 LA Area A4C:       23.7 cm2 LA Area A2C:       23.5 cm2 LA Major Axis A4C: 6.5 cm LA Major Axis A2C: 6.1 cm LA Volume Index:   32.9 ml/m2 LA Vol A4C:        69.3 ml LA Vol A2C:        71.3 ml LA Vol Index BSA:  32.1 ml/m2  RIGHT ATRIUM:                 Normal Ranges: RA Vol A4C:        41.1 ml    (8.3-19.5ml) RA Vol Index A4C:  18.7 ml/m2 RA Area A4C:       16.6 cm2 RA Major Axis A4C: 5.7 cm  M-MODE MEASUREMENTS:         Normal Ranges: Ao Root:             3.50 cm (2.0-3.7cm) AoV Exc:             1.80 cm (1.5-2.5cm)  AORTA MEASUREMENTS:         Normal Ranges: AoV Exc:            1.80 cm (1.5-2.5cm) Ao Sinus, d:        2.80 cm (2.1-3.5cm) Ao STJ, d:          2.50 cm (1.7-3.4cm) Asc Ao, d:          3.20 cm (2.1-3.4cm)  LV SYSTOLIC FUNCTION:                      Normal Ranges: EF-A4C View:    67 % (>=55%) EF-A2C View:    60 % EF-Biplane:     63 % EF-Visual:      60 % LV EF Reported: 60 %  LV DIASTOLIC FUNCTION:             Normal Ranges: MV Peak E:             1.13 m/s    (0.7-1.2 m/s) MV Peak A:             0.65 m/s    (0.42-0.7 m/s) E/A Ratio:             1.74        (1.0-2.2) MV e'                  0.097 m/s   (>8.0) MV lateral e'          0.10 m/s MV medial e'           0.09 m/s E/e' Ratio:            11.64       (<8.0) PulmV Sys Aurelio:         36.90 cm/s PulmV Hussein Aurelio:        42.50 cm/s PulmV S/D Aurelio:         0.90 PulmV A Revs Aurelio:      39.70 cm/s PulmV A Revs Dur:      114.00 msec  MITRAL VALVE:          Normal Ranges: MV DT:        128 msec (150-240msec)  AORTIC VALVE:                      Normal Ranges: AoV Vmax:                2.04 m/s  (<=1.7m/s) AoV Peak P.6 mmHg (<20mmHg) AoV Mean P.0 mmHg  (1.7-11.5mmHg) LVOT Max Aurelio:            1.47 m/s  (<=1.1m/s) AoV VTI:                 45.50 cm  (18-25cm) LVOT VTI:                33.70 cm LVOT  Diameter:           1.90 cm   (1.8-2.4cm) AoV Area, VTI:           2.10 cm2  (2.5-5.5cm2) AoV Area,Vmax:           2.04 cm2  (2.5-4.5cm2) AoV Dimensionless Index: 0.74  RIGHT VENTRICLE: RV Basal 3.40 cm RV Mid   2.60 cm RV Major 6.5 cm TAPSE:   20.8 mm RV s'    0.12 m/s  TRICUSPID VALVE/RVSP:          Normal Ranges: Peak TR Velocity:     2.76 m/s Est. RA Pressure:     3 mmHg RV Syst Pressure:     33 mmHg  (< 30mmHg)  PULMONIC VALVE:          Normal Ranges: PV Accel Time:  187 msec (>120ms) PV Max Aurelio:     0.9 m/s  (0.6-0.9m/s) PV Max PG:      3.5 mmHg  PULMONARY VEINS: PulmV A Revs Dur: 114.00 msec PulmV A Revs Aurelio: 39.70 cm/s PulmV Hussein Aurelio:   42.50 cm/s PulmV S/D Aurelio:    0.90 PulmV Sys Aurelio:    36.90 cm/s  [id number] Julio Cummins MD Electronically signed on 3/20/2025 at 1:07:54 PM  ** Final **     ECG 12 lead    Result Date: 3/20/2025  Sinus rhythm with Premature atrial complexes in a pattern of bigeminy Cannot rule out Anterior infarct (cited on or before 19-MAR-2025) Abnormal ECG When compared with ECG of 19-MAR-2025 21:28, (unconfirmed) Premature atrial complexes are now Present    ECG 12 lead    Result Date: 3/20/2025  Sinus rhythm with Blocked Premature atrial complexes Cannot rule out Anterior infarct , age undetermined Abnormal ECG When compared with ECG of 20-MAR-2025 02:18, (unconfirmed) No significant change was found    XR chest 1 view    Result Date: 3/19/2025  Interpreted By:  Francis Sandoval, STUDY: XR CHEST 1 VIEW;  3/19/2025 10:45 pm   INDICATION: Signs/Symptoms:chest pain.   COMPARISON: None.   ACCESSION NUMBER(S): [id number]   ORDERING CLINICIAN: DEBBIE FAUSTIN   FINDINGS:     No consolidation, pleural effusion, or pneumothorax. Heart size is normal. No acute osseous abnormality. Upper abdomen and superficial soft tissues are unremarkable.       1. No acute cardiopulmonary process.   Signed by: Francis Sandoval 3/19/2025 10:55 PM Dictation workstation:   [id number]    CT head wo IV  contrast    Result Date: 3/19/2025  Interpreted By:  Olman Arreola, STUDY: CT HEAD WO IV CONTRAST;  3/19/2025 10:19 pm   INDICATION: Signs/Symptoms:Heache, hypertensive urgency.   COMPARISON: None.   ACCESSION NUMBER(S): [id number]   ORDERING CLINICIAN: DEBBIE FAUSTIN   TECHNIQUE: Axial noncontrast CT images of the head.   FINDINGS: Gray-white matter differentiation is maintained. There are no extra-axial collections. No mass effect or midline shift. There is no hydrocephalus.There are no significant brain parenchymal abnormalities.   HEMORRHAGE: No acute intracranial hemorrhage.   CALVARIUM: No depressed skull fracture.   EXTRACRANIAL SOFT TISSUES:. Unremarkable.   PARANASAL SINUSES/MASTOIDS: The visualized paranasal sinuses are well aerated. Mastoid air cells are clear.   ORBITS: Grossly normal.       No acute intracranial abnormality.     Signed by: Olman Arreola 3/19/2025 10:34 PM Dictation workstation:   [id number]      Medications:  Scheduled medications  chlorthalidone, 25 mg, oral, Daily  enoxaparin, 40 mg, subcutaneous, q24h LEROY  insulin glargine, 20 Units, subcutaneous, Nightly  insulin lispro, 0-5 Units, subcutaneous, TID AC  lisinopril, 20 mg, oral, Daily  perflutren lipid microspheres, 0.5-10 mL of dilution, intravenous, Once in imaging  perflutren protein A microsphere, 0.5 mL, intravenous, Once in imaging  polyethylene glycol, 17 g, oral, Daily  sulfur hexafluoride microsphr, 2 mL, intravenous, Once in imaging      Continuous medications     PRN medications  PRN medications: cloNIDine, dextrose, dextrose, glucagon, glucagon    Assessment    Assessment & Plan   Ms. Keila Roldan is a 59 y.o. female who presents with chest pain and elevated blood pressure. Past medical history Type 2 DM, HTN, TIMBO, Osteoarthritis, GERD, recently diagnosed BPPV, HLD, Cholecystectomy, Hysterectomy.  Echocardiogram shows normal EF making heart failure less likely.  Troponins were not elevated, no signs of ischemia  on EKG.  Patient is an insulin-dependent diabetic and reports a history of hemoglobin A1c is above 10.  There is strong correlation between diabetes and hypertension.  Recommend lifestyle changes in addition to pharmacological recommendations.    #Hypertensive urgency  #Chest pain, resolved  ::Blood pressure well over 200 systolic multiple times in the emergency department despite labetalol administration.  :: Third troponin collected resulted in 17, overall downward trend making CAD unlikely  :: Suspect that now resolved chest pain is likely a consequence of elevated blood pressure and anxiety  Plan:  -Echocardiogram shows EF 60%, grade 2 pseudo normal pattern of left ventricular diastolic filling with elevated left atrial pressure, left atrial size moderately dilated, RVSP within normal limits  -cardiology consulted = if troponins elevate will consider cath, currently recommending to transition valsartan to Entresto over the next 36 hours; clonidine, chlorthalidone  -EKGs as needed  -Zofran PRN    #Insulin-dependent T2DM, uncontrolled  :: Last hemoglobin A1c 9/2024 11.4  :: Home regimen glipizide 10 mg 2 times per day before meals, 65 units of insulin at bedtime, Januvia, metformin  Plan:  -Repeat hemoglobin A1c pending  -Lantus 20, 5 prepranidal  -SSI  -POCT glucose checks    #Rash  :: Possible allergic reaction, rash on both upper extremities patient complained of pruritus, however source is unclear  Plan:  -Resolved with Benadryl x 1, will refrain from putting on as needed so that team is notified of development    #Chronic Conditions  TIMBO - not on bipap  Osteoarthritis  GERD - protonix  BPPV  HLD - lipitor    Global Plan of Care  Fluid: PRN  Electrolytes: K>4, Mg >2  Nutrition: Regular  DVT Prophylaxis: lovenox  GI Prophylaxis: home protonix  Code Status: Full Code        Patient seen and discussed with the attending.  Signature: Amanda Townsend DO  Date: March 20, 2025  This note has been transcribed  using Dragon voice recognition system and there is a possibility of unintentional typing misprints.  Any information found to be copied from previous providers is done in the best interest of the patient to provide accurate, quality, and continuity of care.

## 2025-03-20 NOTE — CONSULTS
[facility] Card[facility] In-patient Consult Note    Consulting Cardiologist: Julio Cummins MD  Primary Cardiologist:    Reason for Consult: Chest pain and hypertension    Assessment/Plan:    Hypertensive urgency: Patient has severely elevated blood pressure with evidence of at least moderate left ventricular diastolic dysfunction on echocardiogram.  LVEF is normal.  Troponin is fairly flat.  Recommend repeat troponin now.  If this is still flat I think we treat this as hypertensive urgency and not acute coronary syndrome.  EKG is unremarkable.  If troponin is significantly elevated however probably will need cardiac cath before discharge.  With regard to treatment of hypertension she would be ideal candidate for Entresto given the data on women in left ventricular diastolic dysfunction.  Unfortunate she got lisinopril this morning.  So we should transition her to valsartan over the next 36 hours and then ultimately to Entresto.  Probably will need additional agent as well.  2.  Chest pain: As above probably hypertensive urgency.  Plan as above.        History Of Present Illness:    Keila Roldan is a 59 y.o. female presenting with chest pain and severely elevated blood pressure.  Patient's blood pressure was severely elevated even as an outpatient while over 200 with diastolics I believe as high as 116.  She presented with chest discomfort.  She still has some mild chest pressure but is considerably improved from when she came in the hospital.  Her blood pressure was over 200 and is taken several hours to get to just 160 systolic despite multiple PRNs given.  Troponin is trace positive at 21 and on repeat 22.  She has no prior cardiac history.  She has history of hypertension, dyslipidemia Yelle osseous dorsiflexion diameter and diabetes.  She is a non-smoker.  A exactly       Past Medical History:  She has a past medical history of Diabetes (Multi), Gastro-esophageal reflux, Hyperlipidemia, and  Hypertension.    Past Surgical History:  She has no past surgical history on file.    Problem List:  Patient Active Problem List   Diagnosis    Chest pain       Social History:  She reports that she has never smoked. She has never used smokeless tobacco. She reports current alcohol use. She reports that she does not use drugs.    Family History:  No family history on file.     Allergies:  Bactrim [sulfamethoxazole-trimethoprim] and Iodinated contrast media    Inpatient Medications:  Scheduled medications   Medication Dose Route Frequency    chlorthalidone  25 mg oral Daily    enoxaparin  40 mg subcutaneous q24h LEROY    insulin glargine  20 Units subcutaneous Nightly    insulin lispro  0-5 Units subcutaneous TID AC    lisinopril  20 mg oral Daily    polyethylene glycol  17 g oral Daily     PRN medications   Medication    cloNIDine    dextrose    dextrose    glucagon    glucagon     Continuous Medications   Medication Dose Last Rate       Outpatient Medications:  Current Outpatient Medications   Medication Instructions    aspirin 81 mg, Daily    atorvastatin (LIPITOR) 40 mg, Nightly    cetirizine (ZYRTEC) 10 mg, Daily PRN    cholecalciferol (VITAMIN D-3) 50 mcg, Daily    cyclobenzaprine (FLEXERIL) 10 mg, 2 times daily PRN    estradiol (ESTRACE) 1 mg, Daily    glipiZIDE (GLUCOTROL) 10 mg, 2 times daily before meals    Lantus U-100 Insulin 65 Units, Nightly    lisinopril 40 mg, Daily    metFORMIN (GLUCOPHAGE) 1,000 mg, 2 times daily before meals    multivitamin with minerals tablet 1 tablet, Daily    pantoprazole (PROTONIX) 40 mg, Daily before breakfast    SITagliptin phosphate (JANUVIA) 100 mg, Daily       Last Recorded Vitals:  Vitals:    03/20/25 0949 03/20/25 1116 03/20/25 1137 03/20/25 1224   BP: (!) 216/78 (!) 196/66 178/56 168/86   BP Location: Right arm Right arm Right arm Right arm   Patient Position: Lying Sitting Sitting Sitting   Pulse: 72 76 77 71   Resp: 18 18 18 18   Temp:       TempSrc:       SpO2: 95%  97% 97% 96%   Weight:       Height:         Last I/O:  No intake/output data recorded.    Physical Exam  Vitals reviewed.   Constitutional:       Appearance: Normal appearance.   Eyes:      Pupils: Pupils are equal, round, and reactive to light.   Neck:      Vascular: No JVD.   Cardiovascular:      Rate and Rhythm: Normal rate and regular rhythm.      Pulses: Normal pulses.      Heart sounds: No murmur heard.     No gallop.   Pulmonary:      Effort: No respiratory distress.      Breath sounds: No wheezing or rales.   Abdominal:      General: Abdomen is flat. There is no distension.      Palpations: Abdomen is soft.   Musculoskeletal:         General: No swelling.      Right lower leg: No edema.      Left lower leg: No edema.   Neurological:      General: No focal deficit present.      Mental Status: She is alert.   Psychiatric:         Mood and Affect: Mood normal.            Last Labs:  CBC - 3/19/2025:  9:29 PM  11.0 12.9 204    37.6      CMP - 3/19/2025:  9:29 PM  9.5 7.6 39 --- 0.4   _ 4.3 42 73      Troponin I, High Sensitivity   Date/Time Value Ref Range Status   03/20/2025 12:08 AM 22 (H) 0 - 13 ng/L Final   03/19/2025 09:29 PM 21 (H) 0 - 13 ng/L Final     Hemoglobin A1C   Date/Time Value Ref Range Status   09/02/2024 04:21 AM 11.4 (H) 4.3 - 5.6 % Final     Comment:     American Diabetes Association guidelines indicate that patients with HgbA1c in the range 5.7-6.4% are at increased risk for development of diabetes, and intervention by lifestyle modification may be beneficial. HgbA1c greater or equal to 6.5% is considered diagnostic of diabetes.   09/01/2024 05:32 PM 11.4 (H) 4.3 - 5.6 % Final     Comment:     American Diabetes Association guidelines indicate that patients with HgbA1c in the range 5.7-6.4% are at increased risk for development of diabetes, and intervention by lifestyle modification may be beneficial. HgbA1c greater or equal to 6.5% is considered diagnostic of diabetes.       EKG:   Encounter  Date: 03/19/25   ECG 12 lead   Result Value    Ventricular Rate 70    Atrial Rate 70    CO Interval 200    QRS Duration 102    QT Interval 408    QTC Calculation(Bazett) 440    P Axis 96    R Axis 20    T Axis 29    QRS Count 11    Q Onset 215    P Onset 115    P Offset 162    T Offset 419    QTC Fredericia 429    Narrative    Sinus rhythm with Blocked Premature atrial complexes  Cannot rule out Anterior infarct , age undetermined  Abnormal ECG  When compared with ECG of 20-MAR-2025 02:18, (unconfirmed)  No significant change was found     ECHO: Results for orders placed during the hospital encounter of 03/19/25    Transthoracic Echo (TTE) Complete    Narrative  [facility]  [address], [facility] [address]  Tel [phone] Fax [phone]    TRANSTHORACIC ECHOCARDIOGRAM REPORT    Patient Name:       THERESA Tabor Physician:    [id number] Julio Cummins MD  Study Date:         3/20/2025            Ordering Provider:    [id number] SANCHEZ RAY  MRN/PID:            [id number]             Fellow:  Accession#:         [id number]         Nurse:  Date of Birth/Age:  1965 / 59      Sonographer:          Marie hope  Gender Assigned at  F                    Additional Staff:  Birth:  Height:             175.26 cm            Admit Date:  Weight:             104.33 kg            Admission Status:     Inpatient -  Routine  BSA / BMI:          2.19 m2 / 33.97      Department Location:  [facility] Echo Lab  kg/m2  Blood Pressure: 229 /78 mmHg    Study Type:    TRANSTHORACIC ECHO (TTE) COMPLETE  Diagnosis/ICD: Essential (primary) hypertension-I10; Chest pain,  unspecified-R07.9  Indication:    HTN, CP, Abnormal EKG  CPT Codes:     Echo Complete w Full Doppler-[id number]    Patient History:  Pertinent History: Chest Pain and HTN.    Study Detail: The following Echo studies were performed: 2D, M-Mode, Doppler and  color flow.      PHYSICIAN INTERPRETATION:  Left Ventricle: Left ventricular  ejection fraction is normal, by visual estimate at 60%. There is borderline left ventricular hypertrophy. There are no regional wall motion abnormalities. The left ventricular cavity size is normal. There is normal septal and mildly increased posterior left ventricular wall thickness. Spectral Doppler shows a Grade II (pseudonormal pattern) of left ventricular diastolic filling with an elevated left atrial pressure.  Left Atrium: The left atrial size is moderately dilated.  Right Ventricle: The right ventricle is normal in size. There is normal right ventricular global systolic function.  Right Atrium: The right atrial size is normal.  Aortic Valve: The aortic valve is trileaflet. The aortic valve dimensionless index is 0.74. There is no evidence of aortic valve regurgitation. The peak instantaneous gradient of the aortic valve is 17 mmHg. The mean gradient of the aortic valve is 9 mmHg.  Mitral Valve: The mitral valve is normal in structure. There is trace to mild mitral valve regurgitation.  Tricuspid Valve: The tricuspid valve is structurally normal. There is trace tricuspid regurgitation. The Doppler estimated RVSP is within normal limits at 33.5 mmHg.  Pulmonic Valve: The pulmonic valve is structurally normal. There is no indication of pulmonic valve regurgitation.  Pericardium: No pericardial effusion noted.  Aorta: The aortic root is normal.  In comparison to the previous echocardiogram(s): There are no prior studies on this patient for comparison purposes.      CONCLUSIONS:  1. Left ventricular ejection fraction is normal, by visual estimate at 60%.  2. Spectral Doppler shows a Grade II (pseudonormal pattern) of left ventricular diastolic filling with an elevated left atrial pressure.  3. The left atrial size is moderately dilated.  4. Right ventricular systolic pressure is within normal limits.    QUANTITATIVE DATA SUMMARY:    2D MEASUREMENTS:             Normal Ranges:  LAs:             4.20 cm      (2.7-4.0cm)  IVSd:            0.90 cm     (0.6-1.1cm)  LVPWd:           1.20 cm     (0.6-1.1cm)  LVIDd:           5.60 cm     (3.9-5.9cm)  LVIDs:           3.50 cm  LV Mass Index:   106.9 g/m2  LVEDV Index:     47.25 ml/m2  LV % FS          37.5 %      LEFT ATRIUM:                  Normal Ranges:  LA Vol A4C:        73.7 ml    (22+/-6mL/m2)  LA Vol A2C:        76.7 ml  LA Vol BP:         77.4 ml  LA Vol Index A4C:  33.6ml/m2  LA Vol Index A2C:  35.0 ml/m2  LA Vol Index BP:   35.3 ml/m2  LA Area A4C:       23.7 cm2  LA Area A2C:       23.5 cm2  LA Major Axis A4C: 6.5 cm  LA Major Axis A2C: 6.1 cm  LA Volume Index:   32.9 ml/m2  LA Vol A4C:        69.3 ml  LA Vol A2C:        71.3 ml  LA Vol Index BSA:  32.1 ml/m2      RIGHT ATRIUM:                 Normal Ranges:  RA Vol A4C:        41.1 ml    (8.3-19.5ml)  RA Vol Index A4C:  18.7 ml/m2  RA Area A4C:       16.6 cm2  RA Major Axis A4C: 5.7 cm      M-MODE MEASUREMENTS:         Normal Ranges:  Ao Root:             3.50 cm (2.0-3.7cm)  AoV Exc:             1.80 cm (1.5-2.5cm)      AORTA MEASUREMENTS:         Normal Ranges:  AoV Exc:            1.80 cm (1.5-2.5cm)  Ao Sinus, d:        2.80 cm (2.1-3.5cm)  Ao STJ, d:          2.50 cm (1.7-3.4cm)  Asc Ao, d:          3.20 cm (2.1-3.4cm)      LV SYSTOLIC FUNCTION:  Normal Ranges:  EF-A4C View:    67 % (>=55%)  EF-A2C View:    60 %  EF-Biplane:     63 %  EF-Visual:      60 %  LV EF Reported: 60 %      LV DIASTOLIC FUNCTION:             Normal Ranges:  MV Peak E:             1.13 m/s    (0.7-1.2 m/s)  MV Peak A:             0.65 m/s    (0.42-0.7 m/s)  E/A Ratio:             1.74        (1.0-2.2)  MV e'                  0.097 m/s   (>8.0)  MV lateral e'          0.10 m/s  MV medial e'           0.09 m/s  E/e' Ratio:            11.64       (<8.0)  PulmV Sys Aurelio:         36.90 cm/s  PulmV Hussein Aurelio:        42.50 cm/s  PulmV S/D Aurelio:         0.90  PulmV A Revs Aurelio:      39.70 cm/s  PulmV A Revs Dur:      114.00 msec      MITRAL  VALVE:          Normal Ranges:  MV DT:        128 msec (150-240msec)      AORTIC VALVE:                      Normal Ranges:  AoV Vmax:                2.04 m/s  (<=1.7m/s)  AoV Peak P.6 mmHg (<20mmHg)  AoV Mean P.0 mmHg  (1.7-11.5mmHg)  LVOT Max Aurelio:            1.47 m/s  (<=1.1m/s)  AoV VTI:                 45.50 cm  (18-25cm)  LVOT VTI:                33.70 cm  LVOT Diameter:           1.90 cm   (1.8-2.4cm)  AoV Area, VTI:           2.10 cm2  (2.5-5.5cm2)  AoV Area,Vmax:           2.04 cm2  (2.5-4.5cm2)  AoV Dimensionless Index: 0.74      RIGHT VENTRICLE:  RV Basal 3.40 cm  RV Mid   2.60 cm  RV Major 6.5 cm  TAPSE:   20.8 mm  RV s'    0.12 m/s      TRICUSPID VALVE/RVSP:          Normal Ranges:  Peak TR Velocity:     2.76 m/s  Est. RA Pressure:     3 mmHg  RV Syst Pressure:     33 mmHg  (< 30mmHg)      PULMONIC VALVE:          Normal Ranges:  PV Accel Time:  187 msec (>120ms)  PV Max Aurelio:     0.9 m/s  (0.6-0.9m/s)  PV Max PG:      3.5 mmHg      PULMONARY VEINS:  PulmV A Revs Dur: 114.00 msec  PulmV A Revs Aurelio: 39.70 cm/s  PulmV Hussein Aurelio:   42.50 cm/s  PulmV S/D Aurelio:    0.90  PulmV Sys Aurelio:    36.90 cm/s      [id number] Julio Cummins MD  Electronically signed on 3/20/2025 at 1:07:54 PM        ** Final **     CT Results:  No results found for this or any previous visit from the past 365 days.          Julio Cummins MD

## 2025-03-21 LAB
ALBUMIN SERPL BCP-MCNC: 3.7 G/DL (ref 3.4–5)
ANION GAP SERPL CALC-SCNC: 12 MMOL/L (ref 10–20)
BUN SERPL-MCNC: 16 MG/DL (ref 6–23)
CALCIUM SERPL-MCNC: 9.3 MG/DL (ref 8.6–10.3)
CHLORIDE SERPL-SCNC: 100 MMOL/L (ref 98–107)
CO2 SERPL-SCNC: 29 MMOL/L (ref 21–32)
CREAT SERPL-MCNC: 0.62 MG/DL (ref 0.5–1.05)
EGFRCR SERPLBLD CKD-EPI 2021: >90 ML/MIN/1.73M*2
ERYTHROCYTE [DISTWIDTH] IN BLOOD BY AUTOMATED COUNT: 13.1 % (ref 11.5–14.5)
EST. AVERAGE GLUCOSE BLD GHB EST-MCNC: 246 MG/DL
GLUCOSE BLD MANUAL STRIP-MCNC: 248 MG/DL (ref 74–99)
GLUCOSE BLD MANUAL STRIP-MCNC: 284 MG/DL (ref 74–99)
GLUCOSE BLD MANUAL STRIP-MCNC: 295 MG/DL (ref 74–99)
GLUCOSE BLD MANUAL STRIP-MCNC: 375 MG/DL (ref 74–99)
GLUCOSE SERPL-MCNC: 282 MG/DL (ref 74–99)
HBA1C MFR BLD: 10.2 %
HCT VFR BLD AUTO: 36.6 % (ref 36–46)
HGB BLD-MCNC: 11.7 G/DL (ref 12–16)
MAGNESIUM SERPL-MCNC: 1.58 MG/DL (ref 1.6–2.4)
MCH RBC QN AUTO: 28.9 PG (ref 26–34)
MCHC RBC AUTO-ENTMCNC: 32 G/DL (ref 32–36)
MCV RBC AUTO: 90 FL (ref 80–100)
NRBC BLD-RTO: 0 /100 WBCS (ref 0–0)
PHOSPHATE SERPL-MCNC: 3.8 MG/DL (ref 2.5–4.9)
PLATELET # BLD AUTO: 165 X10*3/UL (ref 150–450)
POTASSIUM SERPL-SCNC: 4.1 MMOL/L (ref 3.5–5.3)
RBC # BLD AUTO: 4.05 X10*6/UL (ref 4–5.2)
SODIUM SERPL-SCNC: 137 MMOL/L (ref 136–145)
WBC # BLD AUTO: 7.2 X10*3/UL (ref 4.4–11.3)

## 2025-03-21 PROCEDURE — 96372 THER/PROPH/DIAG INJ SC/IM: CPT

## 2025-03-21 PROCEDURE — 80069 RENAL FUNCTION PANEL: CPT

## 2025-03-21 PROCEDURE — 83735 ASSAY OF MAGNESIUM: CPT

## 2025-03-21 PROCEDURE — 99233 SBSQ HOSP IP/OBS HIGH 50: CPT | Performed by: INTERNAL MEDICINE

## 2025-03-21 PROCEDURE — 2500000002 HC RX 250 W HCPCS SELF ADMINISTERED DRUGS (ALT 637 FOR MEDICARE OP, ALT 636 FOR OP/ED)

## 2025-03-21 PROCEDURE — RXMED WILLOW AMBULATORY MEDICATION CHARGE

## 2025-03-21 PROCEDURE — 2500000001 HC RX 250 WO HCPCS SELF ADMINISTERED DRUGS (ALT 637 FOR MEDICARE OP): Performed by: INTERNAL MEDICINE

## 2025-03-21 PROCEDURE — 85027 COMPLETE CBC AUTOMATED: CPT

## 2025-03-21 PROCEDURE — 99239 HOSP IP/OBS DSCHRG MGMT >30: CPT

## 2025-03-21 PROCEDURE — 2500000004 HC RX 250 GENERAL PHARMACY W/ HCPCS (ALT 636 FOR OP/ED)

## 2025-03-21 PROCEDURE — 36415 COLL VENOUS BLD VENIPUNCTURE: CPT

## 2025-03-21 PROCEDURE — 2500000001 HC RX 250 WO HCPCS SELF ADMINISTERED DRUGS (ALT 637 FOR MEDICARE OP)

## 2025-03-21 PROCEDURE — G0378 HOSPITAL OBSERVATION PER HR: HCPCS

## 2025-03-21 PROCEDURE — 82947 ASSAY GLUCOSE BLOOD QUANT: CPT | Mod: 59

## 2025-03-21 RX ORDER — AMLODIPINE BESYLATE 5 MG/1
5 TABLET ORAL DAILY
Qty: 30 TABLET | Refills: 0 | Status: SHIPPED | OUTPATIENT
Start: 2025-03-21 | End: 2025-04-23

## 2025-03-21 RX ORDER — INSULIN GLARGINE 100 [IU]/ML
75 INJECTION, SOLUTION SUBCUTANEOUS NIGHTLY
Qty: 22.5 ML | Refills: 0 | Status: SHIPPED | OUTPATIENT
Start: 2025-03-21 | End: 2025-04-20

## 2025-03-21 RX ORDER — INSULIN GLARGINE 100 [IU]/ML
30 INJECTION, SOLUTION SUBCUTANEOUS NIGHTLY
Status: DISCONTINUED | OUTPATIENT
Start: 2025-03-21 | End: 2025-03-22 | Stop reason: HOSPADM

## 2025-03-21 RX ORDER — DIPHENHYDRAMINE HCL 25 MG
50 TABLET ORAL ONCE
Status: COMPLETED | OUTPATIENT
Start: 2025-03-21 | End: 2025-03-21

## 2025-03-21 RX ORDER — CHLORTHALIDONE 25 MG/1
25 TABLET ORAL DAILY
Qty: 30 TABLET | Refills: 0 | Status: SHIPPED | OUTPATIENT
Start: 2025-03-22 | End: 2025-04-23

## 2025-03-21 RX ADMIN — INSULIN LISPRO 5 UNITS: 100 INJECTION, SOLUTION INTRAVENOUS; SUBCUTANEOUS at 12:00

## 2025-03-21 RX ADMIN — CHLORTHALIDONE 25 MG: 25 TABLET ORAL at 08:41

## 2025-03-21 RX ADMIN — INSULIN LISPRO 2 UNITS: 100 INJECTION, SOLUTION INTRAVENOUS; SUBCUTANEOUS at 16:34

## 2025-03-21 RX ADMIN — INSULIN LISPRO 5 UNITS: 100 INJECTION, SOLUTION INTRAVENOUS; SUBCUTANEOUS at 08:46

## 2025-03-21 RX ADMIN — VALSARTAN 80 MG: 80 TABLET, FILM COATED ORAL at 08:41

## 2025-03-21 RX ADMIN — CLONIDINE HYDROCHLORIDE 0.1 MG: 0.1 TABLET ORAL at 12:57

## 2025-03-21 RX ADMIN — PANTOPRAZOLE SODIUM 40 MG: 40 TABLET, DELAYED RELEASE ORAL at 08:48

## 2025-03-21 RX ADMIN — ESTRADIOL 1 MG: 1 TABLET ORAL at 08:41

## 2025-03-21 RX ADMIN — INSULIN LISPRO 5 UNITS: 100 INJECTION, SOLUTION INTRAVENOUS; SUBCUTANEOUS at 11:59

## 2025-03-21 RX ADMIN — INSULIN GLARGINE 30 UNITS: 100 INJECTION, SOLUTION SUBCUTANEOUS at 20:51

## 2025-03-21 RX ADMIN — INSULIN LISPRO 3 UNITS: 100 INJECTION, SOLUTION INTRAVENOUS; SUBCUTANEOUS at 08:41

## 2025-03-21 RX ADMIN — INSULIN LISPRO 5 UNITS: 100 INJECTION, SOLUTION INTRAVENOUS; SUBCUTANEOUS at 16:32

## 2025-03-21 RX ADMIN — ATORVASTATIN CALCIUM 40 MG: 40 TABLET, FILM COATED ORAL at 20:54

## 2025-03-21 RX ADMIN — DIPHENHYDRAMINE HYDROCHLORIDE 50 MG: 25 TABLET ORAL at 09:36

## 2025-03-21 RX ADMIN — SACUBITRIL AND VALSARTAN 1 TABLET: 97; 103 TABLET, FILM COATED ORAL at 20:55

## 2025-03-21 ASSESSMENT — COGNITIVE AND FUNCTIONAL STATUS - GENERAL
MOBILITY SCORE: 24
MOBILITY SCORE: 24
DAILY ACTIVITIY SCORE: 24
DAILY ACTIVITIY SCORE: 24

## 2025-03-21 ASSESSMENT — PAIN SCALES - GENERAL
PAINLEVEL_OUTOF10: 0 - NO PAIN
PAINLEVEL_OUTOF10: 0 - NO PAIN

## 2025-03-21 ASSESSMENT — PAIN SCALES - WONG BAKER: WONGBAKER_NUMERICALRESPONSE: NO HURT

## 2025-03-21 ASSESSMENT — ACTIVITIES OF DAILY LIVING (ADL): LACK_OF_TRANSPORTATION: NO

## 2025-03-21 NOTE — PROGRESS NOTES
"Nutrition Diet Education  Reason for education: Uncontrolled DM    Nutrition Note:  Keila Roldan is a 59 y.o. female presenting to ED with chest pain and elevated BP.    Past Medical History   has a past medical history of Diabetes (Multi), Gastro-esophageal reflux, Hyperlipidemia, and Hypertension.    Nutrition Significant Labs:  BMP Trend:   Results from last 7 days   Lab Units 03/21/25  0524 03/20/25  1315 03/19/25  2129   GLUCOSE mg/dL 282* 291* 153*   CALCIUM mg/dL 9.3 9.1 9.5   SODIUM mmol/L 137 136 141   POTASSIUM mmol/L 4.1 3.6 3.6   CO2 mmol/L 29 25 26   CHLORIDE mmol/L 100 102 103   BUN mg/dL 16 18 17   CREATININE mg/dL 0.62 0.59 0.64    , A1C:  Lab Results   Component Value Date    HGBA1C 10.2 (H) 03/20/2025       Current Diet: Adult diet Cardiac, Consistent Carb; CCD 75 gm/meal; 70 gm fat; 2 - 3 grams Sodium    Encounter summary: Met with pt at bedside.  She has been diabetic for many years but states no formula diet education outside of what has been provided to her by her PCP.  She is on the road for work which makes finding healthy choices challenging to her.  She will often pack a few ham sandiches to avoid stopping for takeout.  She typically eats 2-3 meals daily and finds that she often craves sweets after meals like pudding and cookies.  She only drinking sugar free/calorie free fluids.  Does not currently read food label but would like to know how.  Pt notes she has been on PO DM meds and lantus.  Recently had Trulicity added however had not started taking it as she was worried about the side effects listed on the medication.    Education Documentation: Provided and discussed Planning Healthy Meals handout from [employer].  Recommended 60-75g CHO per meal and 15-30g CHO per snack.  Discussed utilizing this as a \"budget\" to allow for some sweets that she enjoys without overindulging.  Discussed sodium content of foods and ways to grocery shop to avoid excess and to avoid takeout restaurants if " possible but to look up nutrition information a head of time if she is going to have to eat out.        Nutrition Prescription/Recommended Diet:  Individualized Nutrition Prescription Provided for : Recommend continue current diet orders for 75g CCD diet and cardiac paraemeters (2-3g sodium, 70g fat)    Time Spent/Follow-up Reminder:   Time Spent (min): 60 minutes  Last Date of Nutrition Visit: 03/21/25  Nutrition Follow-Up Needed?: 5-7 days  Follow up Comment: BROCK

## 2025-03-21 NOTE — CARE PLAN
The patient's goals for the shift include      The clinical goals for the shift include NO CHEST PAIN/HDS.

## 2025-03-21 NOTE — PROGRESS NOTES
03/21/25 1145   Discharge Planning   Living Arrangements Spouse/significant other;Children   Support Systems Children;Spouse/significant other   Assistance Needed none   Type of Residence Private residence   Home or Post Acute Services None   Expected Discharge Disposition Home   Does the patient need discharge transport arranged? No   Financial Resource Strain   How hard is it for you to pay for the very basics like food, housing, medical care, and heating? Not very   Housing Stability   In the last 12 months, was there a time when you were not able to pay the mortgage or rent on time? N   At any time in the past 12 months, were you homeless or living in a shelter (including now)? N   Transportation Needs   In the past 12 months, has lack of transportation kept you from medical appointments or from getting medications? no   In the past 12 months, has lack of transportation kept you from meetings, work, or from getting things needed for daily living? No   Intensity of Service   Intensity of Service 0-30 min     Spoke with patient at bedside to explain my role in discharge planning. Patient states she lives at home with her boyfriend and son. She goes to [facility] for PCP and for prescriptions. Patient feels she effectively manages her health at home and plans to return home when medically ready.

## 2025-03-21 NOTE — CARE PLAN
The patient's goals for the shift include      The clinical goals for the shift include NO CHEST PAIN/HDS.    Problem: Pain - Adult  Goal: Verbalizes/displays adequate comfort level or baseline comfort level  Outcome: Progressing     Problem: Safety - Adult  Goal: Free from fall injury  Outcome: Progressing     Problem: Chronic Conditions and Co-morbidities  Goal: Patient's chronic conditions and co-morbidity symptoms are monitored and maintained or improved  Outcome: Progressing     Problem: Nutrition  Goal: Nutrient intake appropriate for maintaining nutritional needs  Outcome: Progressing     Problem: Skin  Goal: Participates in plan/prevention/treatment measures  Outcome: Progressing  Goal: Prevent/manage excess moisture  Outcome: Progressing

## 2025-03-21 NOTE — SIGNIFICANT EVENT
Patient became nauseous and upon rechecking blood pressure was elevated to 170 systolic.  Will keep patient overnight for further observation.

## 2025-03-21 NOTE — DISCHARGE INSTRUCTIONS
You were found to have very high blood pressure. Please continue to keep a blood pressure log to discuss with your primary care doctor.  You were started on Entresto and chlorthalidone in the hospital.  Entresto will replace your lisinopril. We added a third medication, amlodipine. Please discuss these new medications with your PCP.     We recommend going up to 75 units on your basal insulin as well.    Start = Entresto, chlorthalidone, amlodipine  Stop = lisinopril    Thank you for allowing us to participate in your care.  [facility]

## 2025-03-21 NOTE — NURSING NOTE
PT. SLEPT FAIRLY DENIES C/O CHEST PAIN. NO CHANGES ON THE RASHES ON HER BILATERAL ARM.HAS BEEN UP BY HERSELF TO THE BR.TELE MAINTAINED.

## 2025-03-21 NOTE — PROGRESS NOTES
Spiritual Care Visit  Spiritual Care Request    Reason for Visit:  Routine Visit: Introduction     Request Received From:       Focus of Care:  Visited With: Patient         Refer to :          Spiritual Care Assessment    Spiritual Assessment:                      Care Provided:  Intended Effects: Promote sense of peace, Preserve dignity and respect, Meaning-making  Methods: Offer spiritual/Pentecostal support  Interventions: Share words of hope and inspiration, [place]    Sense of Community and or Shinto Affiliation:  Buddhism         Addressed Needs/Concerns and/or Darius Through:          Outcome:        Advance Directives:         Spiritual Care Annotation    Annotation:  Patient talked about her [employer].   was a supportive presence and prayed at her request.

## 2025-03-21 NOTE — CARE PLAN
The patient's goals for the shift include      The clinical goals for the shift include NO CHEST PAIN/HDS.    Problem: Pain - Adult  Goal: Verbalizes/displays adequate comfort level or baseline comfort level  3/21/2025 0830 by Cele Franks RN  Outcome: Progressing  3/21/2025 0827 by Cele Franks RN  Outcome: Progressing     Problem: Safety - Adult  Goal: Free from fall injury  3/21/2025 0830 by Cele Franks RN  Outcome: Progressing  3/21/2025 0827 by Cele Franks RN  Outcome: Progressing     Problem: Chronic Conditions and Co-morbidities  Goal: Patient's chronic conditions and co-morbidity symptoms are monitored and maintained or improved  3/21/2025 0830 by Cele Franks RN  Outcome: Progressing  3/21/2025 0827 by Cele Franks RN  Outcome: Progressing     Problem: Nutrition  Goal: Nutrient intake appropriate for maintaining nutritional needs  3/21/2025 0830 by Cele Franks RN  Outcome: Progressing  3/21/2025 0827 by Cele Franks RN  Outcome: Progressing     Problem: Skin  Goal: Participates in plan/prevention/treatment measures  3/21/2025 0830 by Cele Franks RN  Outcome: Progressing  3/21/2025 0827 by Cele Franks RN  Outcome: Progressing  Goal: Prevent/manage excess moisture  3/21/2025 0830 by Cele Franks RN  Outcome: Progressing  3/21/2025 0827 by Cele Franks RN  Outcome: Progressing  Goal: Prevent/minimize sheer/friction injuries  3/21/2025 0830 by Cele Franks RN  Outcome: Progressing  3/21/2025 0827 by Cele Franks RN  Flowsheets (Taken 3/21/2025 0827)  Prevent/minimize sheer/friction injuries:   Increase activity/out of bed for meals   Use pull sheet  Goal: Promote/optimize nutrition  Outcome: Progressing  Goal: Promote skin healing  Outcome: Progressing

## 2025-03-21 NOTE — HOSPITAL COURSE
Ms. Keila Roldan is a 59 y.o. female who presents with chest pain and elevated blood pressure. Past medical history Type 2 DM, HTN, TIMBO, Osteoarthritis, GERD, recently diagnosed BPPV, HLD, Cholecystectomy, Hysterectomy. Echocardiogram shows normal EF making heart failure less likely. Troponins were not elevated, no signs of ischemia on EKG. After ED presentation, chest pain resolved however patient remained hypertensive 200s/100s. Echo was normal. EKG showed no ischemia and troponins were negative. She was seen by cardiology and she was taken off lisinopril and started on Entresto. She was also started on amlodipine and chlorthalidone. Her blood pressure was lowered with this regimen. She was told to continue recording daily blood pressures and to discuss this stay and her new meds with her PCP. She was discharged home.    She did have high blood glucose readings and it is recommended to increase basal insulin to 75 units and to discuss this change with her PCP.  
22-Mar-2021

## 2025-03-21 NOTE — PROGRESS NOTES
"  Patient: Keila Roldan  : 1965  MRN: [id number]    Today's Date: 25  Hospital Day: #0    Primary Cardiologist:    ASSESSMENT/PLAN:  Hypertensive urgency: Blood pressure still significantly elevated.  I think we can safely start the Entresto tonight.  I would probably go straight to 97/103.  If she tolerates this then should be able to be discharged home tomorrow.      SUBJECTIVE:  Patient without cardiac complaints.  Her blood pressure came down into the 160s but has not improved beyond that.  When her blood pressure was controlled she was asymptomatic.    OBJECTIVE:  VITALS: BP (!) 186/80 (BP Location: Right arm, Patient Position: Sitting)   Pulse 71   Temp 36 °C (96.8 °F) (Temporal)   Resp 18   Ht 1.753 m (5' 9\")   Wt 106 kg (234 lb 2.1 oz)   SpO2 96%   BMI 34.57 kg/m²       Intake/Output Summary (Last 24 hours) at 3/21/2025 1307  Last data filed at 3/21/2025 1200  Gross per 24 hour   Intake 1141 ml   Output --   Net 1141 ml       Physical Exam  Vitals reviewed.   Cardiovascular:      Rate and Rhythm: Normal rate and regular rhythm.      Heart sounds: No murmur heard.     Gallop present. S4 sounds present.   Pulmonary:      Effort: Pulmonary effort is normal. No respiratory distress.      Breath sounds: No wheezing or rales.   Abdominal:      General: Abdomen is flat. There is no distension.      Palpations: Abdomen is soft.   Musculoskeletal:      Right lower leg: No edema.      Left lower leg: No edema.   Skin:     General: Skin is warm and dry.   Neurological:      General: No focal deficit present.      Mental Status: She is alert and oriented to person, place, and time.   Psychiatric:         Mood and Affect: Mood normal.          Meds:Scheduled medications  atorvastatin, 40 mg, oral, Nightly  chlorthalidone, 25 mg, oral, Daily  enoxaparin, 40 mg, subcutaneous, q24h LEROY  estradiol, 1 mg, oral, Daily  insulin glargine, 30 Units, subcutaneous, Nightly  insulin lispro, 0-5 Units, " subcutaneous, TID AC  insulin lispro, 5 Units, subcutaneous, TID AC  pantoprazole, 40 mg, oral, Daily before breakfast  perflutren lipid microspheres, 0.5-10 mL of dilution, intravenous, Once in imaging  perflutren protein A microsphere, 0.5 mL, intravenous, Once in imaging  polyethylene glycol, 17 g, oral, Daily  [START ON 3/22/2025] sacubitriL-valsartan, 1 tablet, oral, BID  sulfur hexafluoride microsphr, 2 mL, intravenous, Once in imaging      Continuous medications     PRN medications  PRN medications: cetirizine, cloNIDine, cyclobenzaprine, dextrose, dextrose, glucagon, glucagon, ondansetron    Infusions:     DIAGNOSTIC DATA:  Results for orders placed or performed during the hospital encounter of 03/19/25 (from the past 24 hours)   CBC and Auto Differential   Result Value Ref Range    WBC 8.8 4.4 - 11.3 x10*3/uL    nRBC 0.0 0.0 - 0.0 /100 WBCs    RBC 3.99 (L) 4.00 - 5.20 x10*6/uL    Hemoglobin 11.8 (L) 12.0 - 16.0 g/dL    Hematocrit 35.4 (L) 36.0 - 46.0 %    MCV 89 80 - 100 fL    MCH 29.6 26.0 - 34.0 pg    MCHC 33.3 32.0 - 36.0 g/dL    RDW 12.9 11.5 - 14.5 %    Platelets 178 150 - 450 x10*3/uL    Neutrophils % 60.6 40.0 - 80.0 %    Immature Granulocytes %, Automated 0.5 0.0 - 0.9 %    Lymphocytes % 29.3 13.0 - 44.0 %    Monocytes % 6.7 2.0 - 10.0 %    Eosinophils % 2.6 0.0 - 6.0 %    Basophils % 0.3 0.0 - 2.0 %    Neutrophils Absolute 5.32 1.20 - 7.70 x10*3/uL    Immature Granulocytes Absolute, Automated 0.04 0.00 - 0.70 x10*3/uL    Lymphocytes Absolute 2.57 1.20 - 4.80 x10*3/uL    Monocytes Absolute 0.59 0.10 - 1.00 x10*3/uL    Eosinophils Absolute 0.23 0.00 - 0.70 x10*3/uL    Basophils Absolute 0.03 0.00 - 0.10 x10*3/uL   Renal function panel   Result Value Ref Range    Glucose 291 (H) 74 - 99 mg/dL    Sodium 136 136 - 145 mmol/L    Potassium 3.6 3.5 - 5.3 mmol/L    Chloride 102 98 - 107 mmol/L    Bicarbonate 25 21 - 32 mmol/L    Anion Gap 13 10 - 20 mmol/L    Urea Nitrogen 18 6 - 23 mg/dL    Creatinine 0.59  0.50 - 1.05 mg/dL    eGFR >90 >60 mL/min/1.73m*2    Calcium 9.1 8.6 - 10.3 mg/dL    Phosphorus 3.5 2.5 - 4.9 mg/dL    Albumin 3.8 3.4 - 5.0 g/dL   Magnesium   Result Value Ref Range    Magnesium 1.37 (L) 1.60 - 2.40 mg/dL   Troponin I, High Sensitivity   Result Value Ref Range    Troponin I, High Sensitivity 17 (H) 0 - 13 ng/L   Hemoglobin A1c   Result Value Ref Range    Hemoglobin A1C 10.2 (H) See comment %    Estimated Average Glucose 246 Not Established mg/dL   Magnesium   Result Value Ref Range    Magnesium 1.30 (L) 1.60 - 2.40 mg/dL   POCT GLUCOSE   Result Value Ref Range    POCT Glucose 241 (H) 74 - 99 mg/dL   POCT GLUCOSE   Result Value Ref Range    POCT Glucose 305 (H) 74 - 99 mg/dL   CBC   Result Value Ref Range    WBC 7.2 4.4 - 11.3 x10*3/uL    nRBC 0.0 0.0 - 0.0 /100 WBCs    RBC 4.05 4.00 - 5.20 x10*6/uL    Hemoglobin 11.7 (L) 12.0 - 16.0 g/dL    Hematocrit 36.6 36.0 - 46.0 %    MCV 90 80 - 100 fL    MCH 28.9 26.0 - 34.0 pg    MCHC 32.0 32.0 - 36.0 g/dL    RDW 13.1 11.5 - 14.5 %    Platelets 165 150 - 450 x10*3/uL   Renal Function Panel   Result Value Ref Range    Glucose 282 (H) 74 - 99 mg/dL    Sodium 137 136 - 145 mmol/L    Potassium 4.1 3.5 - 5.3 mmol/L    Chloride 100 98 - 107 mmol/L    Bicarbonate 29 21 - 32 mmol/L    Anion Gap 12 10 - 20 mmol/L    Urea Nitrogen 16 6 - 23 mg/dL    Creatinine 0.62 0.50 - 1.05 mg/dL    eGFR >90 >60 mL/min/1.73m*2    Calcium 9.3 8.6 - 10.3 mg/dL    Phosphorus 3.8 2.5 - 4.9 mg/dL    Albumin 3.7 3.4 - 5.0 g/dL   Magnesium   Result Value Ref Range    Magnesium 1.58 (L) 1.60 - 2.40 mg/dL   POCT GLUCOSE   Result Value Ref Range    POCT Glucose 295 (H) 74 - 99 mg/dL   POCT GLUCOSE   Result Value Ref Range    POCT Glucose 375 (H) 74 - 99 mg/dL        Results for orders placed during the hospital encounter of 03/19/25    Transthoracic Echo (TTE) Complete    Narrative  [facility] [place]  [address], [facility] [id number]  Tel [phone] Fax  [phone]    TRANSTHORACIC ECHOCARDIOGRAM REPORT    Patient Name:       THERESA OAKLEY        Reading Physician:    [id number] Julio Cummins MD  Study Date:         3/20/2025            Ordering Provider:    [id number] SANCHEZ RAY  MRN/PID:            [id number]             Fellow:  Accession#:         [id number]         Nurse:  Date of Birth/Age:  1965 / 59      Sonographer:          Marie hope  Gender Assigned at  F                    Additional Staff:  Birth:  Height:             175.26 cm            Admit Date:  Weight:             104.33 kg            Admission Status:     Inpatient -  Routine  BSA / BMI:          2.19 m2 / 33.97      Department Location:  [facility]  kg/m2  Blood Pressure: 229 /78 mmHg    Study Type:    TRANSTHORACIC ECHO (TTE) COMPLETE  Diagnosis/ICD: Essential (primary) hypertension-I10; Chest pain,  unspecified-R07.9  Indication:    HTN, CP, Abnormal EKG  CPT Codes:     Echo Complete w Full [id number]    Patient History:  Pertinent History: Chest Pain and HTN.    Study Detail: The following Echo studies were performed: 2D, M-Mode, Doppler and  color flow.      PHYSICIAN INTERPRETATION:  Left Ventricle: Left ventricular ejection fraction is normal, by visual estimate at 60%. There is borderline left ventricular hypertrophy. There are no regional wall motion abnormalities. The left ventricular cavity size is normal. There is normal septal and mildly increased posterior left ventricular wall thickness. Spectral Doppler shows a Grade II (pseudonormal pattern) of left ventricular diastolic filling with an elevated left atrial pressure.  Left Atrium: The left atrial size is moderately dilated.  Right Ventricle: The right ventricle is normal in size. There is normal right ventricular global systolic function.  Right Atrium: The right atrial size is normal.  Aortic Valve: The aortic valve is trileaflet. The aortic valve dimensionless index is 0.74. There is no evidence of  aortic valve regurgitation. The peak instantaneous gradient of the aortic valve is 17 mmHg. The mean gradient of the aortic valve is 9 mmHg.  Mitral Valve: The mitral valve is normal in structure. There is trace to mild mitral valve regurgitation.  Tricuspid Valve: The tricuspid valve is structurally normal. There is trace tricuspid regurgitation. The Doppler estimated RVSP is within normal limits at 33.5 mmHg.  Pulmonic Valve: The pulmonic valve is structurally normal. There is no indication of pulmonic valve regurgitation.  Pericardium: No pericardial effusion noted.  Aorta: The aortic root is normal.  In comparison to the previous echocardiogram(s): There are no prior studies on this patient for comparison purposes.      CONCLUSIONS:  1. Left ventricular ejection fraction is normal, by visual estimate at 60%.  2. Spectral Doppler shows a Grade II (pseudonormal pattern) of left ventricular diastolic filling with an elevated left atrial pressure.  3. The left atrial size is moderately dilated.  4. Right ventricular systolic pressure is within normal limits.    QUANTITATIVE DATA SUMMARY:    2D MEASUREMENTS:             Normal Ranges:  LAs:             4.20 cm     (2.7-4.0cm)  IVSd:            0.90 cm     (0.6-1.1cm)  LVPWd:           1.20 cm     (0.6-1.1cm)  LVIDd:           5.60 cm     (3.9-5.9cm)  LVIDs:           3.50 cm  LV Mass Index:   106.9 g/m2  LVEDV Index:     47.25 ml/m2  LV % FS          37.5 %      LEFT ATRIUM:                  Normal Ranges:  LA Vol A4C:        73.7 ml    (22+/-6mL/m2)  LA Vol A2C:        76.7 ml  LA Vol BP:         77.4 ml  LA Vol Index A4C:  33.6ml/m2  LA Vol Index A2C:  35.0 ml/m2  LA Vol Index BP:   35.3 ml/m2  LA Area A4C:       23.7 cm2  LA Area A2C:       23.5 cm2  LA Major Axis A4C: 6.5 cm  LA Major Axis A2C: 6.1 cm  LA Volume Index:   32.9 ml/m2  LA Vol A4C:        69.3 ml  LA Vol A2C:        71.3 ml  LA Vol Index BSA:  32.1 ml/m2      RIGHT ATRIUM:                 Normal  Ranges:  RA Vol A4C:        41.1 ml    (8.3-19.5ml)  RA Vol Index A4C:  18.7 ml/m2  RA Area A4C:       16.6 cm2  RA Major Axis A4C: 5.7 cm      M-MODE MEASUREMENTS:         Normal Ranges:  Ao Root:             3.50 cm (2.0-3.7cm)  AoV Exc:             1.80 cm (1.5-2.5cm)      AORTA MEASUREMENTS:         Normal Ranges:  AoV Exc:            1.80 cm (1.5-2.5cm)  Ao Sinus, d:        2.80 cm (2.1-3.5cm)  Ao STJ, d:          2.50 cm (1.7-3.4cm)  Asc Ao, d:          3.20 cm (2.1-3.4cm)      LV SYSTOLIC FUNCTION:  Normal Ranges:  EF-A4C View:    67 % (>=55%)  EF-A2C View:    60 %  EF-Biplane:     63 %  EF-Visual:      60 %  LV EF Reported: 60 %      LV DIASTOLIC FUNCTION:             Normal Ranges:  MV Peak E:             1.13 m/s    (0.7-1.2 m/s)  MV Peak A:             0.65 m/s    (0.42-0.7 m/s)  E/A Ratio:             1.74        (1.0-2.2)  MV e'                  0.097 m/s   (>8.0)  MV lateral e'          0.10 m/s  MV medial e'           0.09 m/s  E/e' Ratio:            11.64       (<8.0)  PulmV Sys Aurelio:         36.90 cm/s  PulmV Hussein Aurelio:        42.50 cm/s  PulmV S/D Aurelio:         0.90  PulmV A Revs Aurelio:      39.70 cm/s  PulmV A Revs Dur:      114.00 msec      MITRAL VALVE:          Normal Ranges:  MV DT:        128 msec (150-240msec)      AORTIC VALVE:                      Normal Ranges:  AoV Vmax:                2.04 m/s  (<=1.7m/s)  AoV Peak P.6 mmHg (<20mmHg)  AoV Mean P.0 mmHg  (1.7-11.5mmHg)  LVOT Max Aurelio:            1.47 m/s  (<=1.1m/s)  AoV VTI:                 45.50 cm  (18-25cm)  LVOT VTI:                33.70 cm  LVOT Diameter:           1.90 cm   (1.8-2.4cm)  AoV Area, VTI:           2.10 cm2  (2.5-5.5cm2)  AoV Area,Vmax:           2.04 cm2  (2.5-4.5cm2)  AoV Dimensionless Index: 0.74      RIGHT VENTRICLE:  RV Basal 3.40 cm  RV Mid   2.60 cm  RV Major 6.5 cm  TAPSE:   20.8 mm  RV s'    0.12 m/s      TRICUSPID VALVE/RVSP:          Normal Ranges:  Peak TR Velocity:     2.76  m/s  Est. RA Pressure:     3 mmHg  RV Syst Pressure:     33 mmHg  (< 30mmHg)      PULMONIC VALVE:          Normal Ranges:  PV Accel Time:  187 msec (>120ms)  PV Max Aurelio:     0.9 m/s  (0.6-0.9m/s)  PV Max PG:      3.5 mmHg      PULMONARY VEINS:  PulmV A Revs Dur: 114.00 msec  PulmV A Revs Aurelio: 39.70 cm/s  PulmV Hussein Aurelio:   42.50 cm/s  PulmV S/D Aurelio:    0.90  PulmV Sys Aurelio:    36.90 cm/s      [id number] Julio Cummins MD  Electronically signed on 3/20/2025 at 1:07:54 PM        ** Final **       No results found for this or any previous visit from the past 365 days.        Julio Cummins MD

## 2025-03-21 NOTE — DISCHARGE SUMMARY
Discharge Diagnosis  Chest pain    Issues Requiring Follow-Up  Start = Entresto, chlorthalidone, amlodipine  Stop = lisinopril  Change = go up to 75 units on basal insulin    Discharge Meds     Medication List      START taking these medications     amLODIPine 5 mg tablet; Commonly known as: Norvasc; Take 1 tablet (5 mg)   by mouth once daily.   chlorthalidone 25 mg tablet; Commonly known as: [place]; Take 1 tablet   (25 mg) by mouth once daily.; Start taking on: March 22, 2025   sacubitriL-valsartan  mg tablet; Commonly known as: Entresto; Take   1 tablet by mouth 2 times a day.     CHANGE how you take these medications     Lantus U-100 Insulin 100 unit/mL injection; Generic drug: insulin   glargine; Inject 75 Units under the skin once daily at bedtime. Take as   directed per insulin instructions.; What changed: how much to take,   additional instructions     CONTINUE taking these medications     aspirin 81 mg EC tablet   atorvastatin 40 mg tablet; Commonly known as: Lipitor   cetirizine 10 mg tablet; Commonly known as: ZyrTEC   cholecalciferol 50 MCG (2000 UT) tablet; Commonly known as: Vitamin D-3   cyclobenzaprine 10 mg tablet; Commonly known as: Flexeril   estradiol 1 mg tablet; Commonly known as: Estrace   glipiZIDE 10 mg tablet; Commonly known as: Glucotrol   metFORMIN 1,000 mg tablet; Commonly known as: Glucophage   multivitamin with minerals tablet   pantoprazole 40 mg EC tablet; Commonly known as: ProtoNix   SITagliptin phosphate 100 mg tablet; Commonly known as: Januvia     STOP taking these medications     lisinopril 40 mg tablet       Test Results Pending At Discharge  Pending Labs       No current pending labs.            Hospital Course  Ms. Keila Roldan is a 59 y.o. female who presents with chest pain and elevated blood pressure. Past medical history Type 2 DM, HTN, TIMBO, Osteoarthritis, GERD, recently diagnosed BPPV, HLD, Cholecystectomy, Hysterectomy. Echocardiogram shows normal EF making  heart failure less likely. Troponins were not elevated, no signs of ischemia on EKG. After ED presentation, chest pain resolved however patient remained hypertensive 200s/100s. Echo was normal. EKG showed no ischemia and troponins were negative. She was seen by cardiology and she was taken off lisinopril and started on Entresto. She was also started on amlodipine and chlorthalidone. Her blood pressure was lowered with this regimen. She was told to continue recording daily blood pressures and to discuss this stay and her new meds with her PCP. She was discharged home.    She did have high blood glucose readings and it is recommended to increase basal insulin to 75 units and to discuss this change with her PCP.    Pertinent Physical Exam At Time of Discharge  Physical Exam  Vitals reviewed.   Eyes:      Conjunctiva/sclera: Conjunctivae normal.   Cardiovascular:      Rate and Rhythm: Normal rate and regular rhythm.   Pulmonary:      Effort: Pulmonary effort is normal. No respiratory distress.      Breath sounds: No wheezing or rales.   Abdominal:      General: Abdomen is flat. There is no distension.      Palpations: Abdomen is soft.   Musculoskeletal:      Right lower leg: No edema.      Left lower leg: No edema.   Skin:     General: Skin is warm and dry.   Neurological:      General: No focal deficit present.      Mental Status: She is alert and oriented to person, place, and time.   Psychiatric:         Mood and Affect: Mood normal.         Outpatient Follow-Up  No future appointments.      Amanda Townsend DO

## 2025-03-22 ENCOUNTER — TELEPHONE (OUTPATIENT)
Dept: CARDIOLOGY | Facility: HOSPITAL | Age: 60
End: 2025-03-22
Payer: COMMERCIAL

## 2025-03-22 VITALS
HEIGHT: 69 IN | WEIGHT: 234.13 LBS | BODY MASS INDEX: 34.68 KG/M2 | TEMPERATURE: 96.8 F | SYSTOLIC BLOOD PRESSURE: 169 MMHG | HEART RATE: 80 BPM | RESPIRATION RATE: 18 BRPM | DIASTOLIC BLOOD PRESSURE: 79 MMHG | OXYGEN SATURATION: 97 %

## 2025-03-22 LAB
ALBUMIN SERPL BCP-MCNC: 4 G/DL (ref 3.4–5)
ANION GAP SERPL CALC-SCNC: 15 MMOL/L (ref 10–20)
BUN SERPL-MCNC: 17 MG/DL (ref 6–23)
CALCIUM SERPL-MCNC: 9.4 MG/DL (ref 8.6–10.3)
CHLORIDE SERPL-SCNC: 94 MMOL/L (ref 98–107)
CO2 SERPL-SCNC: 27 MMOL/L (ref 21–32)
CREAT SERPL-MCNC: 0.54 MG/DL (ref 0.5–1.05)
EGFRCR SERPLBLD CKD-EPI 2021: >90 ML/MIN/1.73M*2
ERYTHROCYTE [DISTWIDTH] IN BLOOD BY AUTOMATED COUNT: 13.2 % (ref 11.5–14.5)
GLUCOSE BLD MANUAL STRIP-MCNC: 331 MG/DL (ref 74–99)
GLUCOSE BLD MANUAL STRIP-MCNC: 357 MG/DL (ref 74–99)
GLUCOSE SERPL-MCNC: 301 MG/DL (ref 74–99)
HCT VFR BLD AUTO: 40.8 % (ref 36–46)
HGB BLD-MCNC: 13.6 G/DL (ref 12–16)
MAGNESIUM SERPL-MCNC: 1.64 MG/DL (ref 1.6–2.4)
MCH RBC QN AUTO: 29.2 PG (ref 26–34)
MCHC RBC AUTO-ENTMCNC: 33.3 G/DL (ref 32–36)
MCV RBC AUTO: 88 FL (ref 80–100)
NRBC BLD-RTO: 0 /100 WBCS (ref 0–0)
PHOSPHATE SERPL-MCNC: 3.6 MG/DL (ref 2.5–4.9)
PLATELET # BLD AUTO: 232 X10*3/UL (ref 150–450)
POTASSIUM SERPL-SCNC: 3.8 MMOL/L (ref 3.5–5.3)
RBC # BLD AUTO: 4.65 X10*6/UL (ref 4–5.2)
SODIUM SERPL-SCNC: 132 MMOL/L (ref 136–145)
WBC # BLD AUTO: 10 X10*3/UL (ref 4.4–11.3)

## 2025-03-22 PROCEDURE — G0378 HOSPITAL OBSERVATION PER HR: HCPCS

## 2025-03-22 PROCEDURE — 2500000002 HC RX 250 W HCPCS SELF ADMINISTERED DRUGS (ALT 637 FOR MEDICARE OP, ALT 636 FOR OP/ED)

## 2025-03-22 PROCEDURE — 2500000001 HC RX 250 WO HCPCS SELF ADMINISTERED DRUGS (ALT 637 FOR MEDICARE OP)

## 2025-03-22 PROCEDURE — 36415 COLL VENOUS BLD VENIPUNCTURE: CPT

## 2025-03-22 PROCEDURE — 2500000001 HC RX 250 WO HCPCS SELF ADMINISTERED DRUGS (ALT 637 FOR MEDICARE OP): Performed by: INTERNAL MEDICINE

## 2025-03-22 PROCEDURE — 99233 SBSQ HOSP IP/OBS HIGH 50: CPT

## 2025-03-22 PROCEDURE — 82947 ASSAY GLUCOSE BLOOD QUANT: CPT | Mod: 59

## 2025-03-22 PROCEDURE — 85027 COMPLETE CBC AUTOMATED: CPT

## 2025-03-22 PROCEDURE — 83735 ASSAY OF MAGNESIUM: CPT

## 2025-03-22 PROCEDURE — 80069 RENAL FUNCTION PANEL: CPT

## 2025-03-22 PROCEDURE — 99232 SBSQ HOSP IP/OBS MODERATE 35: CPT | Performed by: INTERNAL MEDICINE

## 2025-03-22 RX ORDER — AMLODIPINE BESYLATE 5 MG/1
5 TABLET ORAL DAILY
Status: DISCONTINUED | OUTPATIENT
Start: 2025-03-22 | End: 2025-03-22 | Stop reason: HOSPADM

## 2025-03-22 RX ADMIN — PANTOPRAZOLE SODIUM 40 MG: 40 TABLET, DELAYED RELEASE ORAL at 06:17

## 2025-03-22 RX ADMIN — SACUBITRIL AND VALSARTAN 1 TABLET: 97; 103 TABLET, FILM COATED ORAL at 09:04

## 2025-03-22 RX ADMIN — CHLORTHALIDONE 25 MG: 25 TABLET ORAL at 09:04

## 2025-03-22 RX ADMIN — INSULIN LISPRO 4 UNITS: 100 INJECTION, SOLUTION INTRAVENOUS; SUBCUTANEOUS at 09:09

## 2025-03-22 RX ADMIN — ESTRADIOL 1 MG: 1 TABLET ORAL at 09:04

## 2025-03-22 RX ADMIN — AMLODIPINE BESYLATE 5 MG: 5 TABLET ORAL at 09:04

## 2025-03-22 RX ADMIN — INSULIN LISPRO 5 UNITS: 100 INJECTION, SOLUTION INTRAVENOUS; SUBCUTANEOUS at 09:05

## 2025-03-22 ASSESSMENT — COGNITIVE AND FUNCTIONAL STATUS - GENERAL
DAILY ACTIVITIY SCORE: 24
MOBILITY SCORE: 24

## 2025-03-22 ASSESSMENT — PAIN SCALES - GENERAL: PAINLEVEL_OUTOF10: 0 - NO PAIN

## 2025-03-22 NOTE — PROGRESS NOTES
"  Patient: Keila Roldan  : 1965  MRN: [id number]    Today's Date: 25  Hospital Day: #0    Primary Cardiologist:    ASSESSMENT/PLAN:  Hypertensive urgency: Blood pressure still not ideal but much better. I would not make any further changes at this point and let her get used to the new regimen.  Disp: Okay for discharge home, patient can follow-up with me in 3 weeks      SUBJECTIVE:  Patient without cardiac complaints.  She feels well ready for discharge.    OBJECTIVE:  VITALS: /79   Pulse 80   Temp 36 °C (96.8 °F)   Resp 18   Ht 1.753 m (5' 9\")   Wt 106 kg (234 lb 2.1 oz)   SpO2 97%   BMI 34.57 kg/m²       Intake/Output Summary (Last 24 hours) at 3/22/2025 1243  Last data filed at 3/22/2025 0620  Gross per 24 hour   Intake 520 ml   Output 300 ml   Net 220 ml       Physical Exam  Vitals reviewed.   Cardiovascular:      Rate and Rhythm: Normal rate and regular rhythm.      Heart sounds: No murmur heard.  Pulmonary:      Effort: Pulmonary effort is normal. No respiratory distress.      Breath sounds: No wheezing or rales.   Abdominal:      General: Abdomen is flat. There is no distension.      Palpations: Abdomen is soft.   Musculoskeletal:      Right lower leg: No edema.      Left lower leg: No edema.   Skin:     General: Skin is warm and dry.   Neurological:      General: No focal deficit present.      Mental Status: She is alert and oriented to person, place, and time.   Psychiatric:         Mood and Affect: Mood normal.          Meds:Scheduled medications  amLODIPine, 5 mg, oral, Daily  atorvastatin, 40 mg, oral, Nightly  chlorthalidone, 25 mg, oral, Daily  enoxaparin, 40 mg, subcutaneous, q24h LEROY  estradiol, 1 mg, oral, Daily  insulin glargine, 30 Units, subcutaneous, Nightly  insulin lispro, 0-5 Units, subcutaneous, TID AC  insulin lispro, 5 Units, subcutaneous, TID AC  pantoprazole, 40 mg, oral, Daily before breakfast  perflutren lipid microspheres, 0.5-10 mL of dilution, intravenous, " Once in imaging  perflutren protein A microsphere, 0.5 mL, intravenous, Once in imaging  polyethylene glycol, 17 g, oral, Daily  sacubitriL-valsartan, 1 tablet, oral, BID  sulfur hexafluoride microsphr, 2 mL, intravenous, Once in imaging      Continuous medications     PRN medications  PRN medications: cetirizine, cloNIDine, cyclobenzaprine, dextrose, dextrose, glucagon, glucagon, ondansetron    Infusions:     DIAGNOSTIC DATA:  Results for orders placed or performed during the hospital encounter of 03/19/25 (from the past 24 hours)   POCT GLUCOSE   Result Value Ref Range    POCT Glucose 248 (H) 74 - 99 mg/dL   POCT GLUCOSE   Result Value Ref Range    POCT Glucose 284 (H) 74 - 99 mg/dL   CBC   Result Value Ref Range    WBC 10.0 4.4 - 11.3 x10*3/uL    nRBC 0.0 0.0 - 0.0 /100 WBCs    RBC 4.65 4.00 - 5.20 x10*6/uL    Hemoglobin 13.6 12.0 - 16.0 g/dL    Hematocrit 40.8 36.0 - 46.0 %    MCV 88 80 - 100 fL    MCH 29.2 26.0 - 34.0 pg    MCHC 33.3 32.0 - 36.0 g/dL    RDW 13.2 11.5 - 14.5 %    Platelets 232 150 - 450 x10*3/uL   Magnesium   Result Value Ref Range    Magnesium 1.64 1.60 - 2.40 mg/dL   Renal Function Panel   Result Value Ref Range    Glucose 301 (H) 74 - 99 mg/dL    Sodium 132 (L) 136 - 145 mmol/L    Potassium 3.8 3.5 - 5.3 mmol/L    Chloride 94 (L) 98 - 107 mmol/L    Bicarbonate 27 21 - 32 mmol/L    Anion Gap 15 10 - 20 mmol/L    Urea Nitrogen 17 6 - 23 mg/dL    Creatinine 0.54 0.50 - 1.05 mg/dL    eGFR >90 >60 mL/min/1.73m*2    Calcium 9.4 8.6 - 10.3 mg/dL    Phosphorus 3.6 2.5 - 4.9 mg/dL    Albumin 4.0 3.4 - 5.0 g/dL   POCT GLUCOSE   Result Value Ref Range    POCT Glucose 331 (H) 74 - 99 mg/dL   POCT GLUCOSE   Result Value Ref Range    POCT Glucose 357 (H) 74 - 99 mg/dL        Results for orders placed during the hospital encounter of 03/19/25    Transthoracic Echo (TTE) Complete    Narrative  [facility]  [address], [facility] [address]  Tel [phone] Fax [phone]    TRANSTHORACIC  ECHOCARDIOGRAM REPORT    Patient Name:       THERESA OAKLEY        Reading Physician:    [id number] Julio Cummins MD  Study Date:         3/20/2025            Ordering Provider:    [id number] SANCHEZ RAY  MRN/PID:            [id number]             Fellow:  Accession#:         [id number]         Nurse:  Date of Birth/Age:  1965 / 59      Sonographer:          Marie hope  Gender Assigned at  F                    Additional Staff:  Birth:  Height:             175.26 cm            Admit Date:  Weight:             104.33 kg            Admission Status:     Inpatient -  Routine  BSA / BMI:          2.19 m2 / 33.97      Department Location:  [facility] Echo [facility]  kg/m2  Blood Pressure: 229 /78 mmHg    Study Type:    TRANSTHORACIC ECHO (TTE) COMPLETE  Diagnosis/ICD: Essential (primary) hypertension-I10; Chest pain,  unspecified-R07.9  Indication:    HTN, CP, Abnormal EKG  CPT Codes:     Echo Complete w Full Doppler-68087    Patient History:  Pertinent History: Chest Pain and HTN.    Study Detail: The following Echo studies were performed: 2D, M-Mode, Doppler and  color flow.      PHYSICIAN INTERPRETATION:  Left Ventricle: Left ventricular ejection fraction is normal, by visual estimate at 60%. There is borderline left ventricular hypertrophy. There are no regional wall motion abnormalities. The left ventricular cavity size is normal. There is normal septal and mildly increased posterior left ventricular wall thickness. Spectral Doppler shows a Grade II (pseudonormal pattern) of left ventricular diastolic filling with an elevated left atrial pressure.  Left Atrium: The left atrial size is moderately dilated.  Right Ventricle: The right ventricle is normal in size. There is normal right ventricular global systolic function.  Right Atrium: The right atrial size is normal.  Aortic Valve: The aortic valve is trileaflet. The aortic valve dimensionless index is 0.74. There is no evidence of aortic valve regurgitation. The  peak instantaneous gradient of the aortic valve is 17 mmHg. The mean gradient of the aortic valve is 9 mmHg.  Mitral Valve: The mitral valve is normal in structure. There is trace to mild mitral valve regurgitation.  Tricuspid Valve: The tricuspid valve is structurally normal. There is trace tricuspid regurgitation. The Doppler estimated RVSP is within normal limits at 33.5 mmHg.  Pulmonic Valve: The pulmonic valve is structurally normal. There is no indication of pulmonic valve regurgitation.  Pericardium: No pericardial effusion noted.  Aorta: The aortic root is normal.  In comparison to the previous echocardiogram(s): There are no prior studies on this patient for comparison purposes.      CONCLUSIONS:  1. Left ventricular ejection fraction is normal, by visual estimate at 60%.  2. Spectral Doppler shows a Grade II (pseudonormal pattern) of left ventricular diastolic filling with an elevated left atrial pressure.  3. The left atrial size is moderately dilated.  4. Right ventricular systolic pressure is within normal limits.    QUANTITATIVE DATA SUMMARY:    2D MEASUREMENTS:             Normal Ranges:  LAs:             4.20 cm     (2.7-4.0cm)  IVSd:            0.90 cm     (0.6-1.1cm)  LVPWd:           1.20 cm     (0.6-1.1cm)  LVIDd:           5.60 cm     (3.9-5.9cm)  LVIDs:           3.50 cm  LV Mass Index:   106.9 g/m2  LVEDV Index:     47.25 ml/m2  LV % FS          37.5 %      LEFT ATRIUM:                  Normal Ranges:  LA Vol A4C:        73.7 ml    (22+/-6mL/m2)  LA Vol A2C:        76.7 ml  LA Vol BP:         77.4 ml  LA Vol Index A4C:  33.6ml/m2  LA Vol Index A2C:  35.0 ml/m2  LA Vol Index BP:   35.3 ml/m2  LA Area A4C:       23.7 cm2  LA Area A2C:       23.5 cm2  LA Major Axis A4C: 6.5 cm  LA Major Axis A2C: 6.1 cm  LA Volume Index:   32.9 ml/m2  LA Vol A4C:        69.3 ml  LA Vol A2C:        71.3 ml  LA Vol Index BSA:  32.1 ml/m2      RIGHT ATRIUM:                 Normal Ranges:  RA Vol A4C:        41.1 ml     (8.3-19.5ml)  RA Vol Index A4C:  18.7 ml/m2  RA Area A4C:       16.6 cm2  RA Major Axis A4C: 5.7 cm      M-MODE MEASUREMENTS:         Normal Ranges:  Ao Root:             3.50 cm (2.0-3.7cm)  AoV Exc:             1.80 cm (1.5-2.5cm)      AORTA MEASUREMENTS:         Normal Ranges:  AoV Exc:            1.80 cm (1.5-2.5cm)  Ao Sinus, d:        2.80 cm (2.1-3.5cm)  Ao STJ, d:          2.50 cm (1.7-3.4cm)  Asc Ao, d:          3.20 cm (2.1-3.4cm)      LV SYSTOLIC FUNCTION:  Normal Ranges:  EF-A4C View:    67 % (>=55%)  EF-A2C View:    60 %  EF-Biplane:     63 %  EF-Visual:      60 %  LV EF Reported: 60 %      LV DIASTOLIC FUNCTION:             Normal Ranges:  MV Peak E:             1.13 m/s    (0.7-1.2 m/s)  MV Peak A:             0.65 m/s    (0.42-0.7 m/s)  E/A Ratio:             1.74        (1.0-2.2)  MV e'                  0.097 m/s   (>8.0)  MV lateral e'          0.10 m/s  MV medial e'           0.09 m/s  E/e' Ratio:            11.64       (<8.0)  PulmV Sys Aurelio:         36.90 cm/s  PulmV Hussein Aurelio:        42.50 cm/s  PulmV S/D Aurelio:         0.90  PulmV A Revs Aurelio:      39.70 cm/s  PulmV A Revs Dur:      114.00 msec      MITRAL VALVE:          Normal Ranges:  MV DT:        128 msec (150-240msec)      AORTIC VALVE:                      Normal Ranges:  AoV Vmax:                2.04 m/s  (<=1.7m/s)  AoV Peak P.6 mmHg (<20mmHg)  AoV Mean P.0 mmHg  (1.7-11.5mmHg)  LVOT Max Aurelio:            1.47 m/s  (<=1.1m/s)  AoV VTI:                 45.50 cm  (18-25cm)  LVOT VTI:                33.70 cm  LVOT Diameter:           1.90 cm   (1.8-2.4cm)  AoV Area, VTI:           2.10 cm2  (2.5-5.5cm2)  AoV Area,Vmax:           2.04 cm2  (2.5-4.5cm2)  AoV Dimensionless Index: 0.74      RIGHT VENTRICLE:  RV Basal 3.40 cm  RV Mid   2.60 cm  RV Major 6.5 cm  TAPSE:   20.8 mm  RV s'    0.12 m/s      TRICUSPID VALVE/RVSP:          Normal Ranges:  Peak TR Velocity:     2.76 m/s  Est. RA Pressure:     3 mmHg  RV  Syst Pressure:     33 mmHg  (< 30mmHg)      PULMONIC VALVE:          Normal Ranges:  PV Accel Time:  187 msec (>120ms)  PV Max Aurelio:     0.9 m/s  (0.6-0.9m/s)  PV Max PG:      3.5 mmHg      PULMONARY VEINS:  PulmV A Revs Dur: 114.00 msec  PulmV A Revs Aurelio: 39.70 cm/s  PulmV Hussein Aurelio:   42.50 cm/s  PulmV S/D Aurelio:    0.90  PulmV Sys Aurelio:    36.90 cm/s      [id number] Julio Cummins MD  Electronically signed on 3/20/2025 at 1:07:54 PM        ** Final **       No results found for this or any previous visit from the past 365 days.        Julio Cummins MD

## 2025-03-22 NOTE — CARE PLAN
The patient's goals for the shift include      The clinical goals for the shift include Pt will remain hemodynamically stable all shift    Problem: Pain - Adult  Goal: Verbalizes/displays adequate comfort level or baseline comfort level  Outcome: Met     Problem: Safety - Adult  Goal: Free from fall injury  Outcome: Met     Problem: Discharge Planning  Goal: Discharge to home or other facility with appropriate resources  Outcome: Met     Problem: Chronic Conditions and Co-morbidities  Goal: Patient's chronic conditions and co-morbidity symptoms are monitored and maintained or improved  Outcome: Met     Problem: Nutrition  Goal: Nutrient intake appropriate for maintaining nutritional needs  Outcome: Met     Problem: Skin  Goal: Participates in plan/prevention/treatment measures  Outcome: Met  Goal: Prevent/manage excess moisture  Outcome: Met  Goal: Prevent/minimize sheer/friction injuries  Outcome: Met  Goal: Promote/optimize nutrition  3/22/2025 1240 by Monica Hernandez RN  Outcome: Met  3/22/2025 1002 by Monica Hernandez RN  Flowsheets (Taken 3/22/2025 1002)  Promote/optimize nutrition:   Monitor/record intake including meals   Consume > 50% meals/supplements  Goal: Promote skin healing  Outcome: Met     Problem: Fall/Injury  Goal: Not fall by end of shift  Outcome: Met  Goal: Be free from injury by end of the shift  Outcome: Met  Goal: Verbalize understanding of personal risk factors for fall in the hospital  Outcome: Met  Goal: Verbalize understanding of risk factor reduction measures to prevent injury from fall in the home  Outcome: Met  Goal: Use assistive devices by end of the shift  Outcome: Met  Goal: Pace activities to prevent fatigue by end of the shift  Outcome: Met     Problem: ACS/CP/NSTEMI/STEMI  Goal: Chest pain managed (free from pain or at acceptable level)  Outcome: Met  Goal: Lab values return to normal range  Outcome: Met  Goal: Promote self management  Outcome: Met  Goal: Serial ECG will  return to baseline  Outcome: Met  Goal: Verbalize understanding of procedures/devices  Outcome: Met     Problem: Hypertensive Emergency/Crisis  Goal: Blood pressure gradually reduced to goal range  Outcome: Met  Goal: Free from signs of organ damage  Outcome: Met  Goal: Lab values return to normal range  Outcome: Met  Goal: Promote self management  Outcome: Met

## 2025-03-22 NOTE — NURSING NOTE
Home oxygen test completed, results as fallowed.  Patient SPO2 93% while resting without oxygen. Upon exertion patient SPO2 dropped to 85% Patient was placed on oxygen 2 LPM NC resulting in SPO2 90% on exertion. At this time patient qualifies for home oxygen 2 LPM NC on exertion.   Pt discharged to home, IV access discontinued with tip intact, discharge instructions and work excuse note given to Pt. Pt's family transporting her home.

## 2025-03-22 NOTE — PROGRESS NOTES
"  Internal Medicine Daily Progress Note     Patient Keila Roldan PCP No Assigned PCP Generic Provider, MD    MRN [id number]  Admission Date 3/19/2025      Chief Complaint/Reason for Admission:  Keila Roldan is a 59 y.o. female who presented 3/19/2025 with elevated blood pressure    Subjective    Subjective   Interval History  Blood pressure improved overnight and nausea resolved.  She is anxious to go home.      Objective   Vital Signs:  Visit Vitals  /79   Pulse 80   Temp 36 °C (96.8 °F)   Resp 18   Ht 1.753 m (5' 9\")   Wt 106 kg (234 lb 2.1 oz)   SpO2 97%   BMI 34.57 kg/m²   Smoking Status Never   BSA 2.27 m²        Physical Examination:  Physical Exam     Laboratory Data:    Results from last 7 days   Lab Units 03/22/25  0638 03/21/25  0524 03/20/25  1315   WBC AUTO x10*3/uL 10.0 7.2 8.8   RBC AUTO x10*6/uL 4.65 4.05 3.99*   HEMOGLOBIN g/dL 13.6 11.7* 11.8*     Results from last 7 days   Lab Units 03/22/25  0638 03/21/25  0524 03/20/25  1315 03/19/25  2129   SODIUM mmol/L 132* 137 136 141   POTASSIUM mmol/L 3.8 4.1 3.6 3.6   CHLORIDE mmol/L 94* 100 102 103   CO2 mmol/L 27 29 25 26   BUN mg/dL 17 16 18 17   CREATININE mg/dL 0.54 0.62 0.59 0.64   CALCIUM mg/dL 9.4 9.3 9.1 9.5   PHOSPHORUS mg/dL 3.6 3.8 3.5  --    MAGNESIUM mg/dL 1.64 1.58* 1.30*  1.37*  --    BILIRUBIN TOTAL mg/dL  --   --   --  0.4   ALT U/L  --   --   --  42   AST U/L  --   --   --  39       Imaging:  No results found.     Medications:  Scheduled medications  amLODIPine, 5 mg, oral, Daily  atorvastatin, 40 mg, oral, Nightly  chlorthalidone, 25 mg, oral, Daily  enoxaparin, 40 mg, subcutaneous, q24h LEROY  estradiol, 1 mg, oral, Daily  insulin glargine, 30 Units, subcutaneous, Nightly  insulin lispro, 0-5 Units, subcutaneous, TID AC  insulin lispro, 5 Units, subcutaneous, TID AC  pantoprazole, 40 mg, oral, Daily before breakfast  perflutren lipid microspheres, 0.5-10 mL of dilution, intravenous, Once in imaging  perflutren protein A " microsphere, 0.5 mL, intravenous, Once in imaging  polyethylene glycol, 17 g, oral, Daily  sacubitriL-valsartan, 1 tablet, oral, BID  sulfur hexafluoride microsphr, 2 mL, intravenous, Once in imaging      Continuous medications     PRN medications  PRN medications: cetirizine, cloNIDine, cyclobenzaprine, dextrose, dextrose, glucagon, glucagon, ondansetron    Assessment    Assessment & Plan   Ms. Keila Roldan is a 59 y.o. female who presents with chest pain and elevated blood pressure.  Past medical history significant for type 2 diabetes, hypertension, TIMBO, OA, GERD, BPV, hyperlipidemia.  Low suspicion for heart failure since EF was normal and not fluid overloaded on exam.  Troponins were not elevated and no signs of ischemia on EKG making CAD unlikely.  Patient's hemoglobin A1c is greater than 10 and is currently a insulin-dependent diabetic.  Will medicate hypertension and strongly encourage lifestyle changes to ensure better blood pressure management.      #Hypertensive urgency  #Chest pain, resolved  ::Blood pressure well over 200 systolic multiple times in the emergency department despite labetalol administration.  :: Third troponin collected resulted in 17, overall downward trend making CAD unlikely  :: Suspect that now resolved chest pain is likely a consequence of elevated blood pressure and anxiety  Plan:  -Echocardiogram shows EF 60%, grade 2 pseudo normal pattern of left ventricular diastolic filling with elevated left atrial pressure, left atrial size moderately dilated, RVSP within normal limits  -cardiology consulted = chlorthalidone, Entresto  -EKGs as needed  -Zofran PRN     #Insulin-dependent T2DM, uncontrolled  :: Last hemoglobin A1c 9/2024 11.4  :: Home regimen glipizide 10 mg 2 times per day before meals, 65 units of insulin at bedtime, Januvia, metformin  Plan:  -Repeat hemoglobin A1c pending  -Lantus 20, 5 prepranidal = recommend increase in home insulin to 75 units basal  -SSI  -POCT glucose  checks     #Rash  :: Possible allergic reaction, rash on both upper extremities patient complained of pruritus, however source is unclear  Plan:  -Resolved with Benadryl x 1, will refrain from putting on as needed so that team is notified of development     #Chronic Conditions  TIMBO - not on bipap  Osteoarthritis  GERD - protonix  BPPV  HLD - lipitor     Global Plan of Care  Fluid: PRN  Electrolytes: K>4, Mg >2  Nutrition: Regular  DVT Prophylaxis: lovenox  GI Prophylaxis: home protonix  Code Status: Full Code      Patient seen and discussed with the attending.  Signature: Amanda Townsend, DO  Date: March 22, 2025  This note has been transcribed using Dragon voice recognition system and there is a possibility of unintentional typing misprints.  Any information found to be copied from previous providers is done in the best interest of the patient to provide accurate, quality, and continuity of care.

## 2025-03-22 NOTE — CARE PLAN
The patient's goals for the shift include      The clinical goals for the shift include Patient won't experience chest pain throughout this shift    Problem: Safety - Adult  Goal: Free from fall injury  Outcome: Progressing     Problem: Skin  Goal: Prevent/minimize sheer/friction injuries  Outcome: Progressing     Problem: Skin  Goal: Promote skin healing  Outcome: Progressing     Problem: Fall/Injury  Goal: Not fall by end of shift  Outcome: Progressing     Problem: Fall/Injury  Goal: Be free from injury by end of the shift  Outcome: Progressing     Problem: Hypertensive Emergency/Crisis  Goal: Free from signs of organ damage  Outcome: Progressing     Problem: Hypertensive Emergency/Crisis  Goal: Lab values return to normal range  Outcome: Progressing

## 2025-03-24 ENCOUNTER — PHARMACY VISIT (OUTPATIENT)
Dept: PHARMACY | Facility: CLINIC | Age: 60
End: 2025-03-24
Payer: MEDICAID

## 2025-03-29 LAB
ATRIAL RATE: 70 BPM
ATRIAL RATE: 73 BPM
ATRIAL RATE: 75 BPM
ATRIAL RATE: 76 BPM
P AXIS: 59 DEGREES
P AXIS: 96 DEGREES
P OFFSET: 162 MS
P OFFSET: 177 MS
P ONSET: 115 MS
P ONSET: 119 MS
PR INTERVAL: 188 MS
PR INTERVAL: 192 MS
PR INTERVAL: 192 MS
PR INTERVAL: 200 MS
Q ONSET: 213 MS
Q ONSET: 215 MS
Q ONSET: 215 MS
Q ONSET: 216 MS
QRS COUNT: 11 BEATS
QRS COUNT: 13 BEATS
QRS DURATION: 102 MS
QRS DURATION: 102 MS
QRS DURATION: 106 MS
QRS DURATION: 108 MS
QT INTERVAL: 396 MS
QT INTERVAL: 402 MS
QT INTERVAL: 408 MS
QT INTERVAL: 420 MS
QTC CALCULATION(BAZETT): 440 MS
QTC CALCULATION(BAZETT): 442 MS
QTC CALCULATION(BAZETT): 445 MS
QTC CALCULATION(BAZETT): 469 MS
QTC FREDERICIA: 428 MS
QTC FREDERICIA: 429 MS
QTC FREDERICIA: 429 MS
QTC FREDERICIA: 452 MS
R AXIS: 11 DEGREES
R AXIS: 20 DEGREES
R AXIS: 23 DEGREES
R AXIS: 24 DEGREES
T AXIS: 22 DEGREES
T AXIS: 24 DEGREES
T AXIS: 25 DEGREES
T AXIS: 29 DEGREES
T OFFSET: 413 MS
T OFFSET: 417 MS
T OFFSET: 419 MS
T OFFSET: 423 MS
VENTRICULAR RATE: 70 BPM
VENTRICULAR RATE: 73 BPM
VENTRICULAR RATE: 75 BPM
VENTRICULAR RATE: 76 BPM

## 2025-04-17 DIAGNOSIS — I16.1 HYPERTENSIVE EMERGENCY: ICD-10-CM

## 2025-04-18 RX ORDER — CHLORTHALIDONE 25 MG/1
25 TABLET ORAL DAILY
Qty: 90 TABLET | Refills: 1 | Status: SHIPPED | OUTPATIENT
Start: 2025-04-18 | End: 2026-04-18

## 2025-04-18 RX ORDER — AMLODIPINE BESYLATE 5 MG/1
5 TABLET ORAL DAILY
Qty: 90 TABLET | Refills: 1 | Status: SHIPPED | OUTPATIENT
Start: 2025-04-18 | End: 2026-04-18